# Patient Record
Sex: FEMALE | Race: WHITE | NOT HISPANIC OR LATINO | URBAN - METROPOLITAN AREA
[De-identification: names, ages, dates, MRNs, and addresses within clinical notes are randomized per-mention and may not be internally consistent; named-entity substitution may affect disease eponyms.]

---

## 2017-04-17 PROBLEM — Z00.00 ENCOUNTER FOR PREVENTIVE HEALTH EXAMINATION: Status: ACTIVE | Noted: 2017-04-17

## 2017-04-19 ENCOUNTER — OUTPATIENT (OUTPATIENT)
Dept: OUTPATIENT SERVICES | Facility: HOSPITAL | Age: 52
LOS: 1 days | End: 2017-04-19
Payer: MEDICAID

## 2017-04-19 ENCOUNTER — APPOINTMENT (OUTPATIENT)
Dept: MRI IMAGING | Facility: CLINIC | Age: 52
End: 2017-04-19

## 2017-04-19 DIAGNOSIS — Z98.89 OTHER SPECIFIED POSTPROCEDURAL STATES: Chronic | ICD-10-CM

## 2017-04-19 DIAGNOSIS — Z00.8 ENCOUNTER FOR OTHER GENERAL EXAMINATION: ICD-10-CM

## 2017-04-19 PROCEDURE — 73721 MRI JNT OF LWR EXTRE W/O DYE: CPT

## 2017-05-10 ENCOUNTER — APPOINTMENT (OUTPATIENT)
Dept: ORTHOPEDIC SURGERY | Facility: CLINIC | Age: 52
End: 2017-05-10

## 2017-05-12 ENCOUNTER — APPOINTMENT (OUTPATIENT)
Dept: ORTHOPEDIC SURGERY | Facility: CLINIC | Age: 52
End: 2017-05-12

## 2017-05-12 VITALS
HEIGHT: 66 IN | WEIGHT: 145 LBS | DIASTOLIC BLOOD PRESSURE: 75 MMHG | HEART RATE: 71 BPM | BODY MASS INDEX: 23.3 KG/M2 | SYSTOLIC BLOOD PRESSURE: 106 MMHG

## 2017-05-15 ENCOUNTER — APPOINTMENT (OUTPATIENT)
Dept: ORTHOPEDIC SURGERY | Facility: CLINIC | Age: 52
End: 2017-05-15

## 2017-05-19 RX ORDER — CELECOXIB 100 MG/1
100 CAPSULE ORAL
Qty: 30 | Refills: 0 | Status: ACTIVE | COMMUNITY
Start: 2017-05-19 | End: 1900-01-01

## 2017-05-24 RX ORDER — TRAMADOL HYDROCHLORIDE 50 MG/1
50 TABLET, COATED ORAL
Qty: 30 | Refills: 1 | Status: ACTIVE | COMMUNITY
Start: 2017-05-24 | End: 1900-01-01

## 2017-05-26 ENCOUNTER — APPOINTMENT (OUTPATIENT)
Dept: ORTHOPEDIC SURGERY | Facility: CLINIC | Age: 52
End: 2017-05-26

## 2017-06-09 ENCOUNTER — APPOINTMENT (OUTPATIENT)
Dept: ORTHOPEDIC SURGERY | Facility: CLINIC | Age: 52
End: 2017-06-09

## 2017-06-09 VITALS — BODY MASS INDEX: 23.3 KG/M2 | HEIGHT: 66 IN | WEIGHT: 145 LBS

## 2017-06-09 DIAGNOSIS — Z80.9 FAMILY HISTORY OF MALIGNANT NEOPLASM, UNSPECIFIED: ICD-10-CM

## 2017-06-09 DIAGNOSIS — Z78.9 OTHER SPECIFIED HEALTH STATUS: ICD-10-CM

## 2017-06-11 PROBLEM — Z80.9 FAMILY HISTORY OF MALIGNANT NEOPLASM: Status: ACTIVE | Noted: 2017-05-12

## 2017-06-11 RX ORDER — CEFDINIR 300 MG/1
300 CAPSULE ORAL
Qty: 14 | Refills: 0 | Status: ACTIVE | COMMUNITY
Start: 2017-03-31

## 2017-06-11 RX ORDER — ALPRAZOLAM 0.5 MG/1
0.5 TABLET ORAL
Qty: 90 | Refills: 0 | Status: ACTIVE | COMMUNITY
Start: 2016-12-28

## 2017-06-11 RX ORDER — QUETIAPINE FUMARATE 200 MG/1
200 TABLET ORAL
Qty: 30 | Refills: 0 | Status: ACTIVE | COMMUNITY
Start: 2016-12-28

## 2017-06-11 RX ORDER — PHENAZOPYRIDINE HYDROCHLORIDE 200 MG/1
200 TABLET ORAL
Qty: 6 | Refills: 0 | Status: ACTIVE | COMMUNITY
Start: 2017-03-31

## 2017-06-11 RX ORDER — ALPRAZOLAM 2 MG/1
TABLET ORAL
Refills: 0 | Status: ACTIVE | COMMUNITY

## 2017-06-11 RX ORDER — MELOXICAM 7.5 MG/1
7.5 TABLET ORAL
Qty: 30 | Refills: 0 | Status: ACTIVE | COMMUNITY
Start: 2017-04-20

## 2017-06-11 RX ORDER — GABAPENTIN 800 MG/1
800 TABLET, COATED ORAL
Qty: 90 | Refills: 0 | Status: ACTIVE | COMMUNITY
Start: 2016-12-28

## 2017-06-11 RX ORDER — BROMPHENIRAMINE MALEATE, PSEUDOEPHEDRINE HYDROCHLORIDE, 2; 30; 10 MG/5ML; MG/5ML; MG/5ML
30-2-10 SYRUP ORAL
Qty: 200 | Refills: 0 | Status: ACTIVE | COMMUNITY
Start: 2017-05-10

## 2017-06-11 RX ORDER — BENZONATATE 100 MG/1
100 CAPSULE ORAL
Qty: 30 | Refills: 0 | Status: ACTIVE | COMMUNITY
Start: 2017-05-09

## 2017-06-11 RX ORDER — VENLAFAXINE HCL 50 MG
TABLET ORAL
Refills: 0 | Status: ACTIVE | COMMUNITY

## 2017-06-11 RX ORDER — VENLAFAXINE HYDROCHLORIDE 75 MG/1
75 CAPSULE, EXTENDED RELEASE ORAL
Qty: 30 | Refills: 0 | Status: ACTIVE | COMMUNITY
Start: 2017-05-03

## 2017-06-11 RX ORDER — VENLAFAXINE HYDROCHLORIDE 37.5 MG/1
37.5 CAPSULE, EXTENDED RELEASE ORAL
Qty: 30 | Refills: 0 | Status: ACTIVE | COMMUNITY
Start: 2017-04-09

## 2017-06-11 RX ORDER — POLYETHYLENE GLYCOL 3350, SODIUM CHLORIDE, POTASSIUM CHLORIDE, SODIUM BICARBONATE, AND SODIUM SULFATE 240; 5.84; 2.98; 6.72; 22.72 G/4L; G/4L; G/4L; G/4L; G/4L
240 POWDER, FOR SOLUTION ORAL
Qty: 4000 | Refills: 0 | Status: ACTIVE | COMMUNITY
Start: 2017-04-03

## 2017-06-11 RX ORDER — LURASIDONE HYDROCHLORIDE 60 MG/1
60 TABLET, FILM COATED ORAL
Qty: 30 | Refills: 0 | Status: ACTIVE | COMMUNITY
Start: 2016-12-07

## 2017-06-11 RX ORDER — QUETIAPINE 400 MG/1
TABLET, FILM COATED ORAL
Refills: 0 | Status: ACTIVE | COMMUNITY

## 2017-06-11 RX ORDER — LURASIDONE HYDROCHLORIDE 120 MG/1
120 TABLET, FILM COATED ORAL
Qty: 30 | Refills: 0 | Status: ACTIVE | COMMUNITY
Start: 2016-12-28

## 2017-06-11 RX ORDER — SULFAMETHOXAZOLE AND TRIMETHOPRIM 800; 160 MG/1; MG/1
800-160 TABLET ORAL
Qty: 10 | Refills: 0 | Status: ACTIVE | COMMUNITY
Start: 2016-12-19

## 2017-06-11 RX ORDER — LURASIDONE HYDROCHLORIDE 20 MG/1
20 TABLET, FILM COATED ORAL
Qty: 30 | Refills: 0 | Status: ACTIVE | COMMUNITY
Start: 2017-03-12

## 2017-06-11 RX ORDER — DIPHENHYDRAMINE HCL 50 MG/1
50 CAPSULE ORAL
Qty: 60 | Refills: 0 | Status: ACTIVE | COMMUNITY
Start: 2016-12-28

## 2017-06-16 ENCOUNTER — APPOINTMENT (OUTPATIENT)
Dept: ORTHOPEDIC SURGERY | Facility: CLINIC | Age: 52
End: 2017-06-16

## 2017-06-16 DIAGNOSIS — M25.859 OTHER SPECIFIED JOINT DISORDERS, UNSPECIFIED HIP: ICD-10-CM

## 2017-06-16 RX ORDER — TRAMADOL HYDROCHLORIDE 50 MG/1
50 TABLET, COATED ORAL
Qty: 30 | Refills: 1 | Status: ACTIVE | COMMUNITY
Start: 2017-06-16 | End: 1900-01-01

## 2017-06-16 RX ORDER — LIDOCAINE 5% 700 MG/1
5 PATCH TOPICAL
Qty: 4 | Refills: 0 | Status: ACTIVE | COMMUNITY
Start: 2017-06-16 | End: 1900-01-01

## 2017-06-29 RX ORDER — TRAMADOL HYDROCHLORIDE 50 MG/1
50 TABLET, COATED ORAL 3 TIMES DAILY
Qty: 30 | Refills: 0 | Status: ACTIVE | COMMUNITY
Start: 2017-06-29 | End: 1900-01-01

## 2017-07-11 ENCOUNTER — EMERGENCY (EMERGENCY)
Facility: HOSPITAL | Age: 52
LOS: 0 days | Discharge: HOME | End: 2017-07-12
Attending: EMERGENCY MEDICINE
Payer: COMMERCIAL

## 2017-07-11 VITALS
WEIGHT: 139.99 LBS | HEIGHT: 66 IN | TEMPERATURE: 97 F | OXYGEN SATURATION: 95 % | RESPIRATION RATE: 20 BRPM | HEART RATE: 80 BPM | DIASTOLIC BLOOD PRESSURE: 84 MMHG | SYSTOLIC BLOOD PRESSURE: 114 MMHG

## 2017-07-11 DIAGNOSIS — Z98.89 OTHER SPECIFIED POSTPROCEDURAL STATES: Chronic | ICD-10-CM

## 2017-07-11 PROCEDURE — 99284 EMERGENCY DEPT VISIT MOD MDM: CPT

## 2017-07-11 NOTE — ED ADULT TRIAGE NOTE - CHIEF COMPLAINT QUOTE
"shooting,throbbing pain started on right groin to the buttocks ,right hip.lower back right knee 3 days ago getting worse"

## 2017-07-12 VITALS
SYSTOLIC BLOOD PRESSURE: 124 MMHG | TEMPERATURE: 98 F | RESPIRATION RATE: 18 BRPM | HEART RATE: 68 BPM | OXYGEN SATURATION: 96 % | DIASTOLIC BLOOD PRESSURE: 69 MMHG

## 2017-07-12 DIAGNOSIS — M25.551 PAIN IN RIGHT HIP: ICD-10-CM

## 2017-07-12 DIAGNOSIS — R10.30 LOWER ABDOMINAL PAIN, UNSPECIFIED: ICD-10-CM

## 2017-07-12 DIAGNOSIS — F32.9 MAJOR DEPRESSIVE DISORDER, SINGLE EPISODE, UNSPECIFIED: ICD-10-CM

## 2017-07-12 DIAGNOSIS — M54.10 RADICULOPATHY, SITE UNSPECIFIED: ICD-10-CM

## 2017-07-12 RX ORDER — MORPHINE SULFATE 50 MG/1
4 CAPSULE, EXTENDED RELEASE ORAL ONCE
Qty: 0 | Refills: 0 | Status: DISCONTINUED | OUTPATIENT
Start: 2017-07-12 | End: 2017-07-12

## 2017-07-12 RX ADMIN — MORPHINE SULFATE 4 MILLIGRAM(S): 50 CAPSULE, EXTENDED RELEASE ORAL at 03:13

## 2017-07-12 NOTE — ED ADULT NURSE NOTE - OBJECTIVE STATEMENT
Pt c/o R-groin pain radiating down thigh, buttocks to back of leg x 3 days.  pt has hx of labrium tear and is scheduled for sx on 8/14/17.  pt also has hx L-4 L-5 space narrowing.

## 2017-07-12 NOTE — ED PROVIDER NOTE - MEDICAL DECISION MAKING DETAILS
hip and back pain. patient received morphin with resolution. patient now in good condition and discharged with care instructions. she will see ortho tomorrow.

## 2017-07-12 NOTE — ED PROVIDER NOTE - OBJECTIVE STATEMENT
Pertinent PMH/PSH/FHx/SHx and Review of Systems contained within:  53 yo f with PMH of right hip strain and radiculoapathy presents in ED c/o pain after running out of tramadol. Patient is scheduled for surgery in a week. No aggravating or relieving factors, No fever/chills, No photophobia/eye pain/changes in vision, No ear pain/sore throat/dysphagia, No chest pain/palpitations, no SOB/cough/wheeze/stridor, No abdominal pain, No N/V/D, no dysuria/frequency/discharge, No neck/back pain, no rash, no changes in neurological status/function.

## 2017-07-31 ENCOUNTER — OUTPATIENT (OUTPATIENT)
Dept: OUTPATIENT SERVICES | Facility: HOSPITAL | Age: 52
LOS: 1 days | End: 2017-07-31
Payer: MEDICAID

## 2017-07-31 VITALS
SYSTOLIC BLOOD PRESSURE: 118 MMHG | TEMPERATURE: 99 F | WEIGHT: 141.98 LBS | HEART RATE: 72 BPM | RESPIRATION RATE: 20 BRPM | DIASTOLIC BLOOD PRESSURE: 77 MMHG | HEIGHT: 66 IN | OXYGEN SATURATION: 100 %

## 2017-07-31 DIAGNOSIS — M25.859 OTHER SPECIFIED JOINT DISORDERS, UNSPECIFIED HIP: ICD-10-CM

## 2017-07-31 DIAGNOSIS — Z98.89 OTHER SPECIFIED POSTPROCEDURAL STATES: Chronic | ICD-10-CM

## 2017-07-31 DIAGNOSIS — Z90.49 ACQUIRED ABSENCE OF OTHER SPECIFIED PARTS OF DIGESTIVE TRACT: Chronic | ICD-10-CM

## 2017-07-31 DIAGNOSIS — Z01.818 ENCOUNTER FOR OTHER PREPROCEDURAL EXAMINATION: ICD-10-CM

## 2017-07-31 DIAGNOSIS — F31.30 BIPOLAR DISORDER, CURRENT EPISODE DEPRESSED, MILD OR MODERATE SEVERITY, UNSPECIFIED: ICD-10-CM

## 2017-07-31 DIAGNOSIS — Z98.890 OTHER SPECIFIED POSTPROCEDURAL STATES: Chronic | ICD-10-CM

## 2017-07-31 LAB
ANION GAP SERPL CALC-SCNC: 15 MMOL/L — SIGNIFICANT CHANGE UP (ref 5–17)
BUN SERPL-MCNC: 10 MG/DL — SIGNIFICANT CHANGE UP (ref 7–23)
CALCIUM SERPL-MCNC: 9.3 MG/DL — SIGNIFICANT CHANGE UP (ref 8.4–10.5)
CHLORIDE SERPL-SCNC: 101 MMOL/L — SIGNIFICANT CHANGE UP (ref 96–108)
CO2 SERPL-SCNC: 22 MMOL/L — SIGNIFICANT CHANGE UP (ref 22–31)
CREAT SERPL-MCNC: 0.92 MG/DL — SIGNIFICANT CHANGE UP (ref 0.5–1.3)
GLUCOSE SERPL-MCNC: 78 MG/DL — SIGNIFICANT CHANGE UP (ref 70–99)
HCT VFR BLD CALC: 34.7 % — SIGNIFICANT CHANGE UP (ref 34.5–45)
HGB BLD-MCNC: 11.4 G/DL — LOW (ref 11.5–15.5)
MCHC RBC-ENTMCNC: 30.4 PG — SIGNIFICANT CHANGE UP (ref 27–34)
MCHC RBC-ENTMCNC: 32.9 GM/DL — SIGNIFICANT CHANGE UP (ref 32–36)
MCV RBC AUTO: 92.5 FL — SIGNIFICANT CHANGE UP (ref 80–100)
PLATELET # BLD AUTO: 268 K/UL — SIGNIFICANT CHANGE UP (ref 150–400)
POTASSIUM SERPL-MCNC: 4.3 MMOL/L — SIGNIFICANT CHANGE UP (ref 3.5–5.3)
POTASSIUM SERPL-SCNC: 4.3 MMOL/L — SIGNIFICANT CHANGE UP (ref 3.5–5.3)
RBC # BLD: 3.75 M/UL — LOW (ref 3.8–5.2)
RBC # FLD: 14 % — SIGNIFICANT CHANGE UP (ref 10.3–14.5)
SODIUM SERPL-SCNC: 138 MMOL/L — SIGNIFICANT CHANGE UP (ref 135–145)
WBC # BLD: 4.44 K/UL — SIGNIFICANT CHANGE UP (ref 3.8–10.5)
WBC # FLD AUTO: 4.44 K/UL — SIGNIFICANT CHANGE UP (ref 3.8–10.5)

## 2017-07-31 PROCEDURE — G0463: CPT

## 2017-07-31 PROCEDURE — 85027 COMPLETE CBC AUTOMATED: CPT

## 2017-07-31 PROCEDURE — 80048 BASIC METABOLIC PNL TOTAL CA: CPT

## 2017-07-31 RX ORDER — VENLAFAXINE HCL 75 MG
1 CAPSULE, EXT RELEASE 24 HR ORAL
Qty: 0 | Refills: 0 | COMMUNITY

## 2017-07-31 RX ORDER — CEFAZOLIN SODIUM 1 G
2000 VIAL (EA) INJECTION ONCE
Qty: 0 | Refills: 0 | Status: DISCONTINUED | OUTPATIENT
Start: 2017-08-14 | End: 2017-08-29

## 2017-07-31 NOTE — H&P PST ADULT - PROBLEM SELECTOR PLAN 1
scheduled for right hip arthroscopy resection of impingement and labral repair.  urine pregnancy day os surgery

## 2017-07-31 NOTE — H&P PST ADULT - NSANTHOSAYNRD_GEN_A_CORE
No. ROSA screening performed.  STOP BANG Legend: 0-2 = LOW Risk; 3-4 = INTERMEDIATE Risk; 5-8 = HIGH Risk

## 2017-07-31 NOTE — H&P PST ADULT - PSH
H/O colonoscopy    H/O dilation and curettage    S/P appendectomy H/O colonoscopy    H/O dilation and curettage    History of appendectomy    S/P appendectomy

## 2017-07-31 NOTE — H&P PST ADULT - HISTORY OF PRESENT ILLNESS
52 year old female with h/o bipolar disorders, presents to presurgical testing for scheduling right hip arthroscopy resection of impingement and labral repair for right hip joint disorders.

## 2017-08-03 RX ORDER — TRAMADOL HYDROCHLORIDE 50 MG/1
50 TABLET, COATED ORAL
Qty: 30 | Refills: 1 | Status: ACTIVE | COMMUNITY
Start: 2017-08-03 | End: 1900-01-01

## 2017-08-14 ENCOUNTER — APPOINTMENT (OUTPATIENT)
Dept: ORTHOPEDIC SURGERY | Facility: HOSPITAL | Age: 52
End: 2017-08-14

## 2017-08-14 ENCOUNTER — OUTPATIENT (OUTPATIENT)
Dept: OUTPATIENT SERVICES | Facility: HOSPITAL | Age: 52
LOS: 1 days | End: 2017-08-14
Payer: MEDICAID

## 2017-08-14 VITALS
RESPIRATION RATE: 16 BRPM | TEMPERATURE: 98 F | HEART RATE: 69 BPM | OXYGEN SATURATION: 100 % | SYSTOLIC BLOOD PRESSURE: 101 MMHG | DIASTOLIC BLOOD PRESSURE: 70 MMHG

## 2017-08-14 VITALS
TEMPERATURE: 99 F | OXYGEN SATURATION: 99 % | HEART RATE: 82 BPM | RESPIRATION RATE: 14 BRPM | DIASTOLIC BLOOD PRESSURE: 58 MMHG | SYSTOLIC BLOOD PRESSURE: 126 MMHG

## 2017-08-14 DIAGNOSIS — Z98.89 OTHER SPECIFIED POSTPROCEDURAL STATES: Chronic | ICD-10-CM

## 2017-08-14 DIAGNOSIS — M25.859 OTHER SPECIFIED JOINT DISORDERS, UNSPECIFIED HIP: ICD-10-CM

## 2017-08-14 DIAGNOSIS — Z98.890 OTHER SPECIFIED POSTPROCEDURAL STATES: Chronic | ICD-10-CM

## 2017-08-14 DIAGNOSIS — Z01.818 ENCOUNTER FOR OTHER PREPROCEDURAL EXAMINATION: ICD-10-CM

## 2017-08-14 DIAGNOSIS — Z90.49 ACQUIRED ABSENCE OF OTHER SPECIFIED PARTS OF DIGESTIVE TRACT: Chronic | ICD-10-CM

## 2017-08-14 LAB — HCG UR QL: NEGATIVE — SIGNIFICANT CHANGE UP

## 2017-08-14 PROCEDURE — 29916 HIP ARTHRO W/LABRAL REPAIR: CPT | Mod: RT

## 2017-08-14 PROCEDURE — 29914 HIP ARTHRO W/FEMOROPLASTY: CPT | Mod: RT

## 2017-08-14 PROCEDURE — C1713: CPT

## 2017-08-14 PROCEDURE — 97161 PT EVAL LOW COMPLEX 20 MIN: CPT

## 2017-08-14 PROCEDURE — 29915 HIP ARTHRO ACETABULOPLASTY: CPT | Mod: RT

## 2017-08-14 PROCEDURE — C1886: CPT

## 2017-08-14 PROCEDURE — 81025 URINE PREGNANCY TEST: CPT

## 2017-08-14 PROCEDURE — 76000 FLUOROSCOPY <1 HR PHYS/QHP: CPT

## 2017-08-14 RX ORDER — LIDOCAINE HCL 20 MG/ML
0.2 VIAL (ML) INJECTION ONCE
Qty: 0 | Refills: 0 | Status: DISCONTINUED | OUTPATIENT
Start: 2017-08-14 | End: 2017-08-14

## 2017-08-14 RX ORDER — SODIUM CHLORIDE 9 MG/ML
3 INJECTION INTRAMUSCULAR; INTRAVENOUS; SUBCUTANEOUS EVERY 8 HOURS
Qty: 0 | Refills: 0 | Status: DISCONTINUED | OUTPATIENT
Start: 2017-08-14 | End: 2017-08-14

## 2017-08-14 RX ORDER — IBUPROFEN 200 MG
1 TABLET ORAL
Qty: 28 | Refills: 0 | OUTPATIENT
Start: 2017-08-14 | End: 2017-08-21

## 2017-08-14 RX ORDER — ASPIRIN/CALCIUM CARB/MAGNESIUM 324 MG
1 TABLET ORAL
Qty: 60 | Refills: 0 | OUTPATIENT
Start: 2017-08-14 | End: 2017-09-13

## 2017-08-14 RX ORDER — OXYCODONE HYDROCHLORIDE 5 MG/1
5 TABLET ORAL ONCE
Qty: 0 | Refills: 0 | Status: DISCONTINUED | OUTPATIENT
Start: 2017-08-14 | End: 2017-08-14

## 2017-08-14 RX ORDER — POLYETHYLENE GLYCOL 3350 17 G/17G
17 POWDER, FOR SOLUTION ORAL
Qty: 1 | Refills: 0 | OUTPATIENT
Start: 2017-08-14

## 2017-08-14 RX ORDER — HYDROMORPHONE HYDROCHLORIDE 2 MG/ML
0.5 INJECTION INTRAMUSCULAR; INTRAVENOUS; SUBCUTANEOUS
Qty: 0 | Refills: 0 | Status: DISCONTINUED | OUTPATIENT
Start: 2017-08-14 | End: 2017-08-14

## 2017-08-14 RX ORDER — ONDANSETRON 8 MG/1
4 TABLET, FILM COATED ORAL ONCE
Qty: 0 | Refills: 0 | Status: DISCONTINUED | OUTPATIENT
Start: 2017-08-14 | End: 2017-08-14

## 2017-08-14 RX ADMIN — OXYCODONE HYDROCHLORIDE 5 MILLIGRAM(S): 5 TABLET ORAL at 12:19

## 2017-08-14 RX ADMIN — HYDROMORPHONE HYDROCHLORIDE 0.5 MILLIGRAM(S): 2 INJECTION INTRAMUSCULAR; INTRAVENOUS; SUBCUTANEOUS at 11:10

## 2017-08-14 RX ADMIN — HYDROMORPHONE HYDROCHLORIDE 0.5 MILLIGRAM(S): 2 INJECTION INTRAMUSCULAR; INTRAVENOUS; SUBCUTANEOUS at 12:15

## 2017-08-14 RX ADMIN — OXYCODONE HYDROCHLORIDE 5 MILLIGRAM(S): 5 TABLET ORAL at 11:47

## 2017-08-14 RX ADMIN — HYDROMORPHONE HYDROCHLORIDE 0.5 MILLIGRAM(S): 2 INJECTION INTRAMUSCULAR; INTRAVENOUS; SUBCUTANEOUS at 12:18

## 2017-08-14 RX ADMIN — HYDROMORPHONE HYDROCHLORIDE 0.5 MILLIGRAM(S): 2 INJECTION INTRAMUSCULAR; INTRAVENOUS; SUBCUTANEOUS at 10:50

## 2017-08-14 NOTE — PHYSICAL THERAPY INITIAL EVALUATION ADULT - REHAB POTENTIAL, PT EVAL
Patient is cleared for D/C home from physical therapy standpoint. Patient is agreeable, RN and MD aware.

## 2017-08-14 NOTE — ASU DISCHARGE PLAN (ADULT/PEDIATRIC). - MEDICATION SUMMARY - MEDICATIONS TO TAKE
I will START or STAY ON the medications listed below when I get home from the hospital:    ibuprofen 600 mg oral tablet  -- 1 tab(s) by mouth every 6 hours, As Needed for mild pain. Take with food/snack for stomach protection  -- Do not take this drug if you are pregnant.  It is very important that you take or use this exactly as directed.  Do not skip doses or discontinue unless directed by your doctor.  May cause drowsiness or dizziness.  Obtain medical advice before taking any non-prescription drugs as some may affect the action of this medication.  Take with food or milk.    -- Indication: For pain    Percocet 5/325 oral tablet  -- 1 tab(s) by mouth every 8 hours, As Needed for severe pain. MDD:3 tabs  -- Caution federal law prohibits the transfer of this drug to any person other  than the person for whom it was prescribed.  May cause drowsiness.  Alcohol may intensify this effect.  Use care when operating dangerous machinery.  This prescription cannot be refilled.  This product contains acetaminophen.  Do not use  with any other product containing acetaminophen to prevent possible liver damage.  Using more of this medication than prescribed may cause serious breathing problems.    -- Indication: For pain    traMADol 200 mg/24 hours oral capsule, extended release  -- 1 cap(s) by mouth four times a day   -- Indication: For pain    Effexor  --  by mouth   225 mg tablet daily in the morning   -- Indication: For depression    SEROquel  --  by mouth   600 mg tablet in the evening  -- Indication: For bipolar    Xanax 2 mg oral tablet  --  by mouth   twice a day as needed   -- Indication: For anxiety    MiraLax oral powder for reconstitution  -- 17 gram(s) by mouth once a day, As Needed -for constipation  -- Dilute this medication with liquid before administration.  It is very important that you take or use this exactly as directed.  Do not skip doses or discontinue unless directed by your doctor.    -- Indication: For constipation    multivitamin  --   1 tablet daily   -- Indication: For health I will START or STAY ON the medications listed below when I get home from the hospital:    ibuprofen 600 mg oral tablet  -- 1 tab(s) by mouth every 6 hours, As Needed for mild pain. Take with food/snack for stomach protection  -- Do not take this drug if you are pregnant.  It is very important that you take or use this exactly as directed.  Do not skip doses or discontinue unless directed by your doctor.  May cause drowsiness or dizziness.  Obtain medical advice before taking any non-prescription drugs as some may affect the action of this medication.  Take with food or milk.    -- Indication: For pain    Percocet 5/325 oral tablet  -- 1 tab(s) by mouth every 8 hours, As Needed for severe pain. MDD:3 tabs  -- Caution federal law prohibits the transfer of this drug to any person other  than the person for whom it was prescribed.  May cause drowsiness.  Alcohol may intensify this effect.  Use care when operating dangerous machinery.  This prescription cannot be refilled.  This product contains acetaminophen.  Do not use  with any other product containing acetaminophen to prevent possible liver damage.  Using more of this medication than prescribed may cause serious breathing problems.    -- Indication: For pain    Ecotrin 325 mg oral delayed release tablet  -- 1 tab(s) by mouth 2 times a day x 6 weeks  -- Swallow whole.  Do not crush.  Take with food or milk.    -- Indication: For dvt ppx    traMADol 200 mg/24 hours oral capsule, extended release  -- 1 cap(s) by mouth four times a day   -- Indication: For pain    Effexor  --  by mouth   225 mg tablet daily in the morning   -- Indication: For depression    SEROquel  --  by mouth   600 mg tablet in the evening  -- Indication: For bipolar    Xanax 2 mg oral tablet  --  by mouth   twice a day as needed   -- Indication: For anxiety    MiraLax oral powder for reconstitution  -- 17 gram(s) by mouth once a day, As Needed -for constipation  -- Dilute this medication with liquid before administration.  It is very important that you take or use this exactly as directed.  Do not skip doses or discontinue unless directed by your doctor.    -- Indication: For constipation    multivitamin  --   1 tablet daily   -- Indication: For supplement

## 2017-08-14 NOTE — ASU DISCHARGE PLAN (ADULT/PEDIATRIC). - NOTIFY
Fever greater than 101/Swelling that continues/Numbness, color, or temperature change to extremity/Numbness, tingling/Bleeding that does not stop/Pain not relieved by Medications

## 2017-08-15 RX ORDER — OXYCODONE AND ACETAMINOPHEN 5; 325 MG/1; MG/1
5-325 TABLET ORAL
Qty: 30 | Refills: 0 | Status: ACTIVE | COMMUNITY
Start: 2017-08-15 | End: 1900-01-01

## 2017-08-16 ENCOUNTER — TRANSCRIPTION ENCOUNTER (OUTPATIENT)
Age: 52
End: 2017-08-16

## 2017-08-23 ENCOUNTER — APPOINTMENT (OUTPATIENT)
Dept: ORTHOPEDIC SURGERY | Facility: CLINIC | Age: 52
End: 2017-08-23
Payer: MEDICAID

## 2017-08-23 VITALS — BODY MASS INDEX: 23.3 KG/M2 | WEIGHT: 145 LBS | HEIGHT: 66 IN

## 2017-08-23 PROCEDURE — 73502 X-RAY EXAM HIP UNI 2-3 VIEWS: CPT | Mod: RT

## 2017-08-23 PROCEDURE — 99024 POSTOP FOLLOW-UP VISIT: CPT

## 2017-08-23 RX ORDER — OXYCODONE AND ACETAMINOPHEN 10; 325 MG/1; MG/1
10-325 TABLET ORAL 3 TIMES DAILY
Qty: 60 | Refills: 0 | Status: ACTIVE | COMMUNITY
Start: 2017-08-23 | End: 1900-01-01

## 2017-09-13 RX ORDER — TRAMADOL HYDROCHLORIDE 50 MG/1
50 TABLET, COATED ORAL
Qty: 30 | Refills: 1 | Status: ACTIVE | COMMUNITY
Start: 2017-09-13 | End: 1900-01-01

## 2017-09-21 ENCOUNTER — APPOINTMENT (OUTPATIENT)
Dept: ORTHOPEDIC SURGERY | Facility: CLINIC | Age: 52
End: 2017-09-21
Payer: MEDICAID

## 2017-09-21 VITALS — WEIGHT: 145 LBS | HEIGHT: 66 IN | BODY MASS INDEX: 23.3 KG/M2

## 2017-09-21 VITALS — WEIGHT: 145 LBS | BODY MASS INDEX: 23.3 KG/M2 | HEIGHT: 66 IN

## 2017-09-21 PROCEDURE — 99024 POSTOP FOLLOW-UP VISIT: CPT

## 2017-09-21 RX ORDER — OXYCODONE AND ACETAMINOPHEN 5; 325 MG/1; MG/1
5-325 TABLET ORAL
Qty: 40 | Refills: 0 | Status: ACTIVE | COMMUNITY
Start: 2017-09-21 | End: 1900-01-01

## 2017-10-05 RX ORDER — TRAMADOL HYDROCHLORIDE 50 MG/1
50 TABLET, COATED ORAL
Qty: 40 | Refills: 0 | Status: ACTIVE | COMMUNITY
Start: 2017-10-05 | End: 1900-01-01

## 2017-10-19 RX ORDER — TRAMADOL HYDROCHLORIDE 50 MG/1
50 TABLET, COATED ORAL
Qty: 30 | Refills: 1 | Status: ACTIVE | COMMUNITY
Start: 2017-10-19 | End: 1900-01-01

## 2017-10-27 ENCOUNTER — APPOINTMENT (OUTPATIENT)
Dept: ORTHOPEDIC SURGERY | Facility: CLINIC | Age: 52
End: 2017-10-27
Payer: MEDICAID

## 2017-10-27 VITALS
WEIGHT: 145 LBS | HEART RATE: 79 BPM | DIASTOLIC BLOOD PRESSURE: 93 MMHG | BODY MASS INDEX: 28.47 KG/M2 | SYSTOLIC BLOOD PRESSURE: 130 MMHG | HEIGHT: 60 IN

## 2017-10-27 DIAGNOSIS — M47.817 SPONDYLOSIS W/OUT MYELOPATHY OR RADICULOPATHY, LUMBOSACRAL REGION: ICD-10-CM

## 2017-10-27 PROCEDURE — 72100 X-RAY EXAM L-S SPINE 2/3 VWS: CPT

## 2017-10-27 PROCEDURE — 99214 OFFICE O/P EST MOD 30 MIN: CPT | Mod: 24

## 2017-10-27 PROCEDURE — 72170 X-RAY EXAM OF PELVIS: CPT

## 2017-10-27 RX ORDER — IBUPROFEN 600 MG/1
600 TABLET, FILM COATED ORAL
Qty: 28 | Refills: 0 | Status: ACTIVE | COMMUNITY
Start: 2017-08-14

## 2017-10-27 RX ORDER — ALPRAZOLAM 1 MG/1
1 TABLET ORAL
Qty: 60 | Refills: 0 | Status: ACTIVE | COMMUNITY
Start: 2017-07-07

## 2017-10-27 RX ORDER — VENLAFAXINE HYDROCHLORIDE 150 MG/1
150 CAPSULE, EXTENDED RELEASE ORAL
Qty: 30 | Refills: 0 | Status: ACTIVE | COMMUNITY
Start: 2017-07-19

## 2017-10-27 RX ORDER — PSYLLIUM HUSK 3.4 G/7G
30.9 POWDER, FOR SOLUTION ORAL
Qty: 538 | Refills: 0 | Status: ACTIVE | COMMUNITY
Start: 2017-10-03

## 2017-10-27 RX ORDER — ASPIRIN 325 MG/1
325 TABLET ORAL
Qty: 60 | Refills: 0 | Status: ACTIVE | COMMUNITY
Start: 2017-08-14

## 2017-10-27 RX ORDER — POLYETHYLENE GLYCOL 3350 17 G/17G
17 POWDER, FOR SOLUTION ORAL
Qty: 255 | Refills: 0 | Status: ACTIVE | COMMUNITY
Start: 2017-08-14

## 2017-10-27 RX ORDER — TRAZODONE HYDROCHLORIDE 50 MG/1
50 TABLET ORAL
Qty: 30 | Refills: 0 | Status: ACTIVE | COMMUNITY
Start: 2017-10-25

## 2017-11-03 ENCOUNTER — RX RENEWAL (OUTPATIENT)
Age: 52
End: 2017-11-03

## 2017-11-03 ENCOUNTER — OTHER (OUTPATIENT)
Age: 52
End: 2017-11-03

## 2017-11-03 RX ORDER — TRAMADOL HYDROCHLORIDE 50 MG/1
50 TABLET, COATED ORAL 3 TIMES DAILY
Qty: 84 | Refills: 0 | Status: ACTIVE | COMMUNITY
Start: 2017-11-03 | End: 1900-01-01

## 2017-11-03 RX ORDER — MUPIROCIN 20 MG/G
2 OINTMENT TOPICAL
Qty: 22 | Refills: 0 | Status: ACTIVE | COMMUNITY
Start: 2017-11-03 | End: 1900-01-01

## 2017-11-07 ENCOUNTER — OUTPATIENT (OUTPATIENT)
Dept: OUTPATIENT SERVICES | Facility: HOSPITAL | Age: 52
LOS: 1 days | End: 2017-11-07
Payer: MEDICAID

## 2017-11-07 VITALS
RESPIRATION RATE: 14 BRPM | TEMPERATURE: 98 F | DIASTOLIC BLOOD PRESSURE: 80 MMHG | HEART RATE: 80 BPM | HEIGHT: 65 IN | WEIGHT: 149.91 LBS | SYSTOLIC BLOOD PRESSURE: 122 MMHG

## 2017-11-07 DIAGNOSIS — M16.11 UNILATERAL PRIMARY OSTEOARTHRITIS, RIGHT HIP: ICD-10-CM

## 2017-11-07 DIAGNOSIS — Z98.890 OTHER SPECIFIED POSTPROCEDURAL STATES: Chronic | ICD-10-CM

## 2017-11-07 DIAGNOSIS — Z98.89 OTHER SPECIFIED POSTPROCEDURAL STATES: Chronic | ICD-10-CM

## 2017-11-07 DIAGNOSIS — F31.30 BIPOLAR DISORDER, CURRENT EPISODE DEPRESSED, MILD OR MODERATE SEVERITY, UNSPECIFIED: ICD-10-CM

## 2017-11-07 DIAGNOSIS — Z90.49 ACQUIRED ABSENCE OF OTHER SPECIFIED PARTS OF DIGESTIVE TRACT: Chronic | ICD-10-CM

## 2017-11-07 LAB
APPEARANCE UR: CLEAR — SIGNIFICANT CHANGE UP
BILIRUB UR-MCNC: NEGATIVE — SIGNIFICANT CHANGE UP
BLD GP AB SCN SERPL QL: NEGATIVE — SIGNIFICANT CHANGE UP
BLOOD UR QL VISUAL: NEGATIVE — SIGNIFICANT CHANGE UP
BUN SERPL-MCNC: 19 MG/DL — SIGNIFICANT CHANGE UP (ref 7–23)
CALCIUM SERPL-MCNC: 9.6 MG/DL — SIGNIFICANT CHANGE UP (ref 8.4–10.5)
CHLORIDE SERPL-SCNC: 102 MMOL/L — SIGNIFICANT CHANGE UP (ref 98–107)
CO2 SERPL-SCNC: 29 MMOL/L — SIGNIFICANT CHANGE UP (ref 22–31)
COLOR SPEC: SIGNIFICANT CHANGE UP
CREAT SERPL-MCNC: 1.05 MG/DL — SIGNIFICANT CHANGE UP (ref 0.5–1.3)
GLUCOSE SERPL-MCNC: 72 MG/DL — SIGNIFICANT CHANGE UP (ref 70–99)
GLUCOSE UR-MCNC: NEGATIVE — SIGNIFICANT CHANGE UP
HBA1C BLD-MCNC: 5.5 % — SIGNIFICANT CHANGE UP (ref 4–5.6)
HCG SERPL-ACNC: 10.24 MIU/ML — SIGNIFICANT CHANGE UP
HCT VFR BLD CALC: 39.4 % — SIGNIFICANT CHANGE UP (ref 34.5–45)
HGB BLD-MCNC: 12.6 G/DL — SIGNIFICANT CHANGE UP (ref 11.5–15.5)
KETONES UR-MCNC: NEGATIVE — SIGNIFICANT CHANGE UP
LEUKOCYTE ESTERASE UR-ACNC: NEGATIVE — SIGNIFICANT CHANGE UP
MCHC RBC-ENTMCNC: 29.9 PG — SIGNIFICANT CHANGE UP (ref 27–34)
MCHC RBC-ENTMCNC: 32 % — SIGNIFICANT CHANGE UP (ref 32–36)
MCV RBC AUTO: 93.4 FL — SIGNIFICANT CHANGE UP (ref 80–100)
MUCOUS THREADS # UR AUTO: SIGNIFICANT CHANGE UP
NITRITE UR-MCNC: NEGATIVE — SIGNIFICANT CHANGE UP
NRBC # FLD: 0 — SIGNIFICANT CHANGE UP
PH UR: 6 — SIGNIFICANT CHANGE UP (ref 4.6–8)
PLATELET # BLD AUTO: 341 K/UL — SIGNIFICANT CHANGE UP (ref 150–400)
PMV BLD: 9.5 FL — SIGNIFICANT CHANGE UP (ref 7–13)
POTASSIUM SERPL-MCNC: 4.2 MMOL/L — SIGNIFICANT CHANGE UP (ref 3.5–5.3)
POTASSIUM SERPL-SCNC: 4.2 MMOL/L — SIGNIFICANT CHANGE UP (ref 3.5–5.3)
PROT UR-MCNC: NEGATIVE — SIGNIFICANT CHANGE UP
RBC # BLD: 4.22 M/UL — SIGNIFICANT CHANGE UP (ref 3.8–5.2)
RBC # FLD: 12.9 % — SIGNIFICANT CHANGE UP (ref 10.3–14.5)
RH IG SCN BLD-IMP: POSITIVE — SIGNIFICANT CHANGE UP
SODIUM SERPL-SCNC: 143 MMOL/L — SIGNIFICANT CHANGE UP (ref 135–145)
SP GR SPEC: 1.01 — SIGNIFICANT CHANGE UP (ref 1–1.03)
UROBILINOGEN FLD QL: NORMAL E.U. — SIGNIFICANT CHANGE UP (ref 0.1–0.2)
WBC # BLD: 4.55 K/UL — SIGNIFICANT CHANGE UP (ref 3.8–10.5)
WBC # FLD AUTO: 4.55 K/UL — SIGNIFICANT CHANGE UP (ref 3.8–10.5)
WBC UR QL: SIGNIFICANT CHANGE UP (ref 0–?)

## 2017-11-07 PROCEDURE — 93010 ELECTROCARDIOGRAM REPORT: CPT

## 2017-11-07 RX ORDER — TRAMADOL HYDROCHLORIDE 50 MG/1
50 TABLET ORAL ONCE
Qty: 0 | Refills: 0 | Status: DISCONTINUED | OUTPATIENT
Start: 2017-11-13 | End: 2017-11-13

## 2017-11-07 RX ORDER — GABAPENTIN 400 MG/1
300 CAPSULE ORAL ONCE
Qty: 0 | Refills: 0 | Status: COMPLETED | OUTPATIENT
Start: 2017-11-13 | End: 2017-11-13

## 2017-11-07 RX ORDER — TRAMADOL HYDROCHLORIDE 50 MG/1
1 TABLET ORAL
Qty: 0 | Refills: 0 | COMMUNITY

## 2017-11-07 RX ORDER — ACETAMINOPHEN 500 MG
975 TABLET ORAL ONCE
Qty: 0 | Refills: 0 | Status: COMPLETED | OUTPATIENT
Start: 2017-11-13 | End: 2017-11-13

## 2017-11-07 RX ORDER — PANTOPRAZOLE SODIUM 20 MG/1
40 TABLET, DELAYED RELEASE ORAL ONCE
Qty: 0 | Refills: 0 | Status: COMPLETED | OUTPATIENT
Start: 2017-11-13 | End: 2017-11-13

## 2017-11-07 RX ORDER — VENLAFAXINE HCL 75 MG
0 CAPSULE, EXT RELEASE 24 HR ORAL
Qty: 0 | Refills: 0 | COMMUNITY

## 2017-11-07 RX ORDER — SODIUM CHLORIDE 9 MG/ML
3 INJECTION INTRAMUSCULAR; INTRAVENOUS; SUBCUTANEOUS EVERY 8 HOURS
Qty: 0 | Refills: 0 | Status: DISCONTINUED | OUTPATIENT
Start: 2017-11-13 | End: 2017-11-14

## 2017-11-07 NOTE — H&P PST ADULT - NEGATIVE GENERAL SYMPTOMS
no weight loss/no malaise/no fatigue/no weight gain/no polyphagia/no polydipsia/no chills/no sweating/no anorexia/no fever/no polyuria

## 2017-11-07 NOTE — H&P PST ADULT - HISTORY OF PRESENT ILLNESS
52 year old female presents to presurgical testing with diagnosis of unilateral primary osteoarthritis, right hip scheduled for right total hip replacement direct anterior approach for 11/13/17. Pt is s/p right hip arthroscopy, labral repair on 8/14/17, with increasing right hip pain and dysfunction. Pt reports right hip weakness, instability falls at home. Left hip is also painful but not as much. Xray and MRI of right hip completed and referred for surgical evaluation. 88

## 2017-11-07 NOTE — H&P PST ADULT - NEGATIVE CARDIOVASCULAR SYMPTOMS
no peripheral edema/no dyspnea on exertion/no claudication/no orthopnea/no paroxysmal nocturnal dyspnea/no chest pain/no palpitations

## 2017-11-07 NOTE — H&P PST ADULT - PROBLEM SELECTOR PLAN 1
Pt is scheduled for right total hip replacement direct anterior approach for 11/13/17. Preop instructions, pepcid, surgical instructions provided. Pt stated understanding. Pending medical clearance, completed appt on 11/6/17.

## 2017-11-07 NOTE — H&P PST ADULT - PMH
Anemia    Appendicitis  1996  Bipolar depression    Other specified joint disorders, unspecified hip    Unilateral primary osteoarthritis, right hip

## 2017-11-07 NOTE — H&P PST ADULT - MUSCULOSKELETAL
details… detailed exam no calf tenderness/normal strength/decreased ROM due to pain/right hip/no joint erythema/no joint swelling/no joint warmth

## 2017-11-07 NOTE — H&P PST ADULT - PROBLEM SELECTOR PLAN 2
Instructed to take xanax on the morning of procedure. Instructed to take Seroquel the night prior to procedure as usual

## 2017-11-07 NOTE — H&P PST ADULT - PSH
H/O arthroscopy  Right hip, labral repair 8/14/17  H/O colonoscopy    H/O dilation and curettage    History of appendectomy

## 2017-11-07 NOTE — H&P PST ADULT - NEGATIVE ENMT SYMPTOMS
no ear pain/no tinnitus/no hearing difficulty/no nose bleeds/no sinus symptoms/no post-nasal discharge/no throat pain/no dysphagia

## 2017-11-07 NOTE — H&P PST ADULT - FAMILY HISTORY
Mother  Still living? Unknown  Family history of depression, Age at diagnosis: Age Unknown     Sibling  Still living? Unknown  Family history of schizophrenia, Age at diagnosis: Age Unknown

## 2017-11-07 NOTE — H&P PST ADULT - NEGATIVE OPHTHALMOLOGIC SYMPTOMS
no loss of vision L/no discharge R/no pain L/no diplopia/no blurred vision L/no loss of vision R/no discharge L/no pain R/no blurred vision R

## 2017-11-07 NOTE — H&P PST ADULT - NEGATIVE GENERAL GENITOURINARY SYMPTOMS
no incontinence/normal urinary frequency/no flank pain R/no bladder infections/no urinary hesitancy/no dysuria/no flank pain L/no renal colic/no hematuria/no nocturia

## 2017-11-07 NOTE — H&P PST ADULT - NEGATIVE NEUROLOGICAL SYMPTOMS
no weakness/no paresthesias/no transient paralysis/no vertigo/no generalized seizures/no tremors/no loss of sensation/no facial palsy/no focal seizures/no syncope/no loss of consciousness/no confusion

## 2017-11-07 NOTE — H&P PST ADULT - RS GEN PE MLT RESP DETAILS PC
respirations non-labored/no rhonchi/no subcutaneous emphysema/clear to auscultation bilaterally/no chest wall tenderness/no wheezes/no intercostal retractions/no rales/airway patent/good air movement/breath sounds equal

## 2017-11-08 ENCOUNTER — OTHER (OUTPATIENT)
Age: 52
End: 2017-11-08

## 2017-11-08 DIAGNOSIS — M16.0 BILATERAL PRIMARY OSTEOARTHRITIS OF HIP: ICD-10-CM

## 2017-11-08 LAB
BACTERIA UR CULT: SIGNIFICANT CHANGE UP
SPECIMEN SOURCE: SIGNIFICANT CHANGE UP
SPECIMEN SOURCE: SIGNIFICANT CHANGE UP

## 2017-11-09 ENCOUNTER — RESULT REVIEW (OUTPATIENT)
Age: 52
End: 2017-11-09

## 2017-11-09 LAB — BACTERIA NPH CULT: SIGNIFICANT CHANGE UP

## 2017-11-12 ENCOUNTER — FORM ENCOUNTER (OUTPATIENT)
Age: 52
End: 2017-11-12

## 2017-11-13 ENCOUNTER — APPOINTMENT (OUTPATIENT)
Dept: ORTHOPEDIC SURGERY | Facility: HOSPITAL | Age: 52
End: 2017-11-13

## 2017-11-13 ENCOUNTER — RESULT REVIEW (OUTPATIENT)
Age: 52
End: 2017-11-13

## 2017-11-13 ENCOUNTER — INPATIENT (INPATIENT)
Facility: HOSPITAL | Age: 52
LOS: 0 days | Discharge: HOME HEALTH | End: 2017-11-14
Attending: ORTHOPAEDIC SURGERY | Admitting: ORTHOPAEDIC SURGERY
Payer: MEDICAID

## 2017-11-13 VITALS
TEMPERATURE: 98 F | HEIGHT: 65 IN | SYSTOLIC BLOOD PRESSURE: 119 MMHG | WEIGHT: 149.91 LBS | DIASTOLIC BLOOD PRESSURE: 68 MMHG | RESPIRATION RATE: 20 BRPM | HEART RATE: 70 BPM | OXYGEN SATURATION: 100 %

## 2017-11-13 DIAGNOSIS — Z98.890 OTHER SPECIFIED POSTPROCEDURAL STATES: Chronic | ICD-10-CM

## 2017-11-13 DIAGNOSIS — M16.11 UNILATERAL PRIMARY OSTEOARTHRITIS, RIGHT HIP: ICD-10-CM

## 2017-11-13 DIAGNOSIS — Z90.49 ACQUIRED ABSENCE OF OTHER SPECIFIED PARTS OF DIGESTIVE TRACT: Chronic | ICD-10-CM

## 2017-11-13 LAB
BUN SERPL-MCNC: 11 MG/DL — SIGNIFICANT CHANGE UP (ref 7–23)
CALCIUM SERPL-MCNC: 8.9 MG/DL — SIGNIFICANT CHANGE UP (ref 8.4–10.5)
CHLORIDE SERPL-SCNC: 104 MMOL/L — SIGNIFICANT CHANGE UP (ref 98–107)
CO2 SERPL-SCNC: 29 MMOL/L — SIGNIFICANT CHANGE UP (ref 22–31)
CREAT SERPL-MCNC: 0.84 MG/DL — SIGNIFICANT CHANGE UP (ref 0.5–1.3)
GLUCOSE SERPL-MCNC: 116 MG/DL — HIGH (ref 70–99)
HCG UR QL: NEGATIVE — SIGNIFICANT CHANGE UP
HCT VFR BLD CALC: 32.1 % — LOW (ref 34.5–45)
HGB BLD-MCNC: 10.3 G/DL — LOW (ref 11.5–15.5)
MCHC RBC-ENTMCNC: 29.7 PG — SIGNIFICANT CHANGE UP (ref 27–34)
MCHC RBC-ENTMCNC: 32.1 % — SIGNIFICANT CHANGE UP (ref 32–36)
MCV RBC AUTO: 92.5 FL — SIGNIFICANT CHANGE UP (ref 80–100)
NRBC # FLD: 0 — SIGNIFICANT CHANGE UP
PLATELET # BLD AUTO: 353 K/UL — SIGNIFICANT CHANGE UP (ref 150–400)
PMV BLD: 8.8 FL — SIGNIFICANT CHANGE UP (ref 7–13)
POTASSIUM SERPL-MCNC: 4.4 MMOL/L — SIGNIFICANT CHANGE UP (ref 3.5–5.3)
POTASSIUM SERPL-SCNC: 4.4 MMOL/L — SIGNIFICANT CHANGE UP (ref 3.5–5.3)
RBC # BLD: 3.47 M/UL — LOW (ref 3.8–5.2)
RBC # FLD: 12.4 % — SIGNIFICANT CHANGE UP (ref 10.3–14.5)
RH IG SCN BLD-IMP: POSITIVE — SIGNIFICANT CHANGE UP
SODIUM SERPL-SCNC: 144 MMOL/L — SIGNIFICANT CHANGE UP (ref 135–145)
WBC # BLD: 8.11 K/UL — SIGNIFICANT CHANGE UP (ref 3.8–10.5)
WBC # FLD AUTO: 8.11 K/UL — SIGNIFICANT CHANGE UP (ref 3.8–10.5)

## 2017-11-13 PROCEDURE — 88311 DECALCIFY TISSUE: CPT | Mod: 26

## 2017-11-13 PROCEDURE — 88304 TISSUE EXAM BY PATHOLOGIST: CPT | Mod: 26

## 2017-11-13 PROCEDURE — 72170 X-RAY EXAM OF PELVIS: CPT | Mod: 26

## 2017-11-13 PROCEDURE — 27130 TOTAL HIP ARTHROPLASTY: CPT | Mod: RT

## 2017-11-13 RX ORDER — SODIUM CHLORIDE 9 MG/ML
1000 INJECTION, SOLUTION INTRAVENOUS
Qty: 0 | Refills: 0 | Status: DISCONTINUED | OUTPATIENT
Start: 2017-11-13 | End: 2017-11-13

## 2017-11-13 RX ORDER — DEXAMETHASONE 0.5 MG/5ML
4 ELIXIR ORAL EVERY 6 HOURS
Qty: 0 | Refills: 0 | Status: DISCONTINUED | OUTPATIENT
Start: 2017-11-13 | End: 2017-11-14

## 2017-11-13 RX ORDER — SENNA PLUS 8.6 MG/1
2 TABLET ORAL AT BEDTIME
Qty: 0 | Refills: 0 | Status: DISCONTINUED | OUTPATIENT
Start: 2017-11-13 | End: 2017-11-14

## 2017-11-13 RX ORDER — DOCUSATE SODIUM 100 MG
100 CAPSULE ORAL THREE TIMES A DAY
Qty: 0 | Refills: 0 | Status: DISCONTINUED | OUTPATIENT
Start: 2017-11-13 | End: 2017-11-14

## 2017-11-13 RX ORDER — ONDANSETRON 8 MG/1
4 TABLET, FILM COATED ORAL EVERY 6 HOURS
Qty: 0 | Refills: 0 | Status: DISCONTINUED | OUTPATIENT
Start: 2017-11-13 | End: 2017-11-13

## 2017-11-13 RX ORDER — TRAMADOL HYDROCHLORIDE 50 MG/1
50 TABLET ORAL EVERY 8 HOURS
Qty: 0 | Refills: 0 | Status: DISCONTINUED | OUTPATIENT
Start: 2017-11-13 | End: 2017-11-14

## 2017-11-13 RX ORDER — ASPIRIN/CALCIUM CARB/MAGNESIUM 324 MG
325 TABLET ORAL
Qty: 0 | Refills: 0 | Status: DISCONTINUED | OUTPATIENT
Start: 2017-11-13 | End: 2017-11-14

## 2017-11-13 RX ORDER — ALPRAZOLAM 0.25 MG
2 TABLET ORAL
Qty: 0 | Refills: 0 | Status: DISCONTINUED | OUTPATIENT
Start: 2017-11-13 | End: 2017-11-14

## 2017-11-13 RX ORDER — MORPHINE SULFATE 50 MG/1
4 CAPSULE, EXTENDED RELEASE ORAL ONCE
Qty: 0 | Refills: 0 | Status: DISCONTINUED | OUTPATIENT
Start: 2017-11-13 | End: 2017-11-14

## 2017-11-13 RX ORDER — ACETAMINOPHEN 500 MG
650 TABLET ORAL EVERY 6 HOURS
Qty: 0 | Refills: 0 | Status: DISCONTINUED | OUTPATIENT
Start: 2017-11-13 | End: 2017-11-14

## 2017-11-13 RX ORDER — SODIUM CHLORIDE 9 MG/ML
1000 INJECTION INTRAMUSCULAR; INTRAVENOUS; SUBCUTANEOUS ONCE
Qty: 0 | Refills: 0 | Status: COMPLETED | OUTPATIENT
Start: 2017-11-13 | End: 2017-11-13

## 2017-11-13 RX ORDER — OXYCODONE HYDROCHLORIDE 5 MG/1
5 TABLET ORAL EVERY 4 HOURS
Qty: 0 | Refills: 0 | Status: DISCONTINUED | OUTPATIENT
Start: 2017-11-13 | End: 2017-11-14

## 2017-11-13 RX ORDER — CEFAZOLIN SODIUM 1 G
2000 VIAL (EA) INJECTION EVERY 8 HOURS
Qty: 0 | Refills: 0 | Status: COMPLETED | OUTPATIENT
Start: 2017-11-13 | End: 2017-11-14

## 2017-11-13 RX ORDER — OXYCODONE HYDROCHLORIDE 5 MG/1
10 TABLET ORAL EVERY 4 HOURS
Qty: 0 | Refills: 0 | Status: DISCONTINUED | OUTPATIENT
Start: 2017-11-13 | End: 2017-11-14

## 2017-11-13 RX ORDER — OXYCODONE HYDROCHLORIDE 5 MG/1
10 TABLET ORAL
Qty: 0 | Refills: 0 | Status: DISCONTINUED | OUTPATIENT
Start: 2017-11-13 | End: 2017-11-14

## 2017-11-13 RX ORDER — DEXAMETHASONE 0.5 MG/5ML
10 ELIXIR ORAL ONCE
Qty: 0 | Refills: 0 | Status: COMPLETED | OUTPATIENT
Start: 2017-11-14 | End: 2017-11-14

## 2017-11-13 RX ORDER — OXYCODONE HYDROCHLORIDE 5 MG/1
5 TABLET ORAL
Qty: 0 | Refills: 0 | Status: DISCONTINUED | OUTPATIENT
Start: 2017-11-13 | End: 2017-11-14

## 2017-11-13 RX ORDER — VENLAFAXINE HCL 75 MG
225 CAPSULE, EXT RELEASE 24 HR ORAL AT BEDTIME
Qty: 0 | Refills: 0 | Status: DISCONTINUED | OUTPATIENT
Start: 2017-11-13 | End: 2017-11-14

## 2017-11-13 RX ORDER — MORPHINE SULFATE 50 MG/1
0.15 CAPSULE, EXTENDED RELEASE ORAL ONCE
Qty: 0 | Refills: 0 | Status: DISCONTINUED | OUTPATIENT
Start: 2017-11-13 | End: 2017-11-14

## 2017-11-13 RX ORDER — NALOXONE HYDROCHLORIDE 4 MG/.1ML
0.1 SPRAY NASAL
Qty: 0 | Refills: 0 | Status: DISCONTINUED | OUTPATIENT
Start: 2017-11-13 | End: 2017-11-14

## 2017-11-13 RX ORDER — HYDROMORPHONE HYDROCHLORIDE 2 MG/ML
1 INJECTION INTRAMUSCULAR; INTRAVENOUS; SUBCUTANEOUS
Qty: 0 | Refills: 0 | Status: DISCONTINUED | OUTPATIENT
Start: 2017-11-13 | End: 2017-11-14

## 2017-11-13 RX ORDER — TRAMADOL HYDROCHLORIDE 50 MG/1
50 TABLET ORAL THREE TIMES A DAY
Qty: 0 | Refills: 0 | Status: DISCONTINUED | OUTPATIENT
Start: 2017-11-13 | End: 2017-11-13

## 2017-11-13 RX ORDER — ONDANSETRON 8 MG/1
4 TABLET, FILM COATED ORAL EVERY 6 HOURS
Qty: 0 | Refills: 0 | Status: DISCONTINUED | OUTPATIENT
Start: 2017-11-13 | End: 2017-11-14

## 2017-11-13 RX ORDER — PANTOPRAZOLE SODIUM 20 MG/1
40 TABLET, DELAYED RELEASE ORAL DAILY
Qty: 0 | Refills: 0 | Status: DISCONTINUED | OUTPATIENT
Start: 2017-11-13 | End: 2017-11-14

## 2017-11-13 RX ORDER — KETOROLAC TROMETHAMINE 30 MG/ML
15 SYRINGE (ML) INJECTION EVERY 8 HOURS
Qty: 0 | Refills: 0 | Status: DISCONTINUED | OUTPATIENT
Start: 2017-11-13 | End: 2017-11-14

## 2017-11-13 RX ORDER — DIPHENHYDRAMINE HCL 50 MG
12.5 CAPSULE ORAL EVERY 4 HOURS
Qty: 0 | Refills: 0 | Status: DISCONTINUED | OUTPATIENT
Start: 2017-11-13 | End: 2017-11-14

## 2017-11-13 RX ORDER — SODIUM CHLORIDE 9 MG/ML
1000 INJECTION INTRAMUSCULAR; INTRAVENOUS; SUBCUTANEOUS ONCE
Qty: 0 | Refills: 0 | Status: COMPLETED | OUTPATIENT
Start: 2017-11-14 | End: 2017-11-14

## 2017-11-13 RX ORDER — SODIUM CHLORIDE 9 MG/ML
1000 INJECTION, SOLUTION INTRAVENOUS
Qty: 0 | Refills: 0 | Status: DISCONTINUED | OUTPATIENT
Start: 2017-11-13 | End: 2017-11-14

## 2017-11-13 RX ORDER — CELECOXIB 200 MG/1
200 CAPSULE ORAL
Qty: 0 | Refills: 0 | Status: DISCONTINUED | OUTPATIENT
Start: 2017-11-15 | End: 2017-11-14

## 2017-11-13 RX ORDER — QUETIAPINE FUMARATE 200 MG/1
600 TABLET, FILM COATED ORAL AT BEDTIME
Qty: 0 | Refills: 0 | Status: DISCONTINUED | OUTPATIENT
Start: 2017-11-13 | End: 2017-11-14

## 2017-11-13 RX ORDER — POLYETHYLENE GLYCOL 3350 17 G/17G
17 POWDER, FOR SOLUTION ORAL DAILY
Qty: 0 | Refills: 0 | Status: DISCONTINUED | OUTPATIENT
Start: 2017-11-13 | End: 2017-11-14

## 2017-11-13 RX ADMIN — Medication 975 MILLIGRAM(S): at 11:40

## 2017-11-13 RX ADMIN — Medication 2 MILLIGRAM(S): at 20:50

## 2017-11-13 RX ADMIN — Medication 225 MILLIGRAM(S): at 22:52

## 2017-11-13 RX ADMIN — TRAMADOL HYDROCHLORIDE 50 MILLIGRAM(S): 50 TABLET ORAL at 22:52

## 2017-11-13 RX ADMIN — Medication 100 MILLIGRAM(S): at 22:53

## 2017-11-13 RX ADMIN — QUETIAPINE FUMARATE 600 MILLIGRAM(S): 200 TABLET, FILM COATED ORAL at 22:52

## 2017-11-13 RX ADMIN — SENNA PLUS 2 TABLET(S): 8.6 TABLET ORAL at 22:53

## 2017-11-13 RX ADMIN — Medication 325 MILLIGRAM(S): at 19:24

## 2017-11-13 RX ADMIN — TRAMADOL HYDROCHLORIDE 50 MILLIGRAM(S): 50 TABLET ORAL at 11:40

## 2017-11-13 RX ADMIN — Medication 15 MILLIGRAM(S): at 22:52

## 2017-11-13 RX ADMIN — SODIUM CHLORIDE 3 MILLILITER(S): 9 INJECTION INTRAMUSCULAR; INTRAVENOUS; SUBCUTANEOUS at 22:53

## 2017-11-13 RX ADMIN — GABAPENTIN 300 MILLIGRAM(S): 400 CAPSULE ORAL at 11:41

## 2017-11-13 RX ADMIN — Medication 650 MILLIGRAM(S): at 19:26

## 2017-11-13 RX ADMIN — SODIUM CHLORIDE 1000 MILLILITER(S): 9 INJECTION INTRAMUSCULAR; INTRAVENOUS; SUBCUTANEOUS at 19:57

## 2017-11-13 RX ADMIN — PANTOPRAZOLE SODIUM 40 MILLIGRAM(S): 20 TABLET, DELAYED RELEASE ORAL at 11:40

## 2017-11-13 RX ADMIN — Medication 2 MILLIGRAM(S): at 17:17

## 2017-11-13 NOTE — CHART NOTE - NSCHARTNOTEFT_GEN_A_CORE
Ortho Post-Op Check    Pt seen and examined. Doing well. Pain controlled.  No complaints.    Vital Signs Last 24 Hrs  T(C): 36.8 (13 Nov 2017 22:02), Max: 36.9 (13 Nov 2017 19:42)  T(F): 98.3 (13 Nov 2017 22:02), Max: 98.5 (13 Nov 2017 19:42)  HR: 89 (13 Nov 2017 22:02) (69 - 90)  BP: 104/55 (13 Nov 2017 22:02) (104/55 - 126/81)  BP(mean): --  RR: 18 (13 Nov 2017 22:02) (16 - 22)  SpO2: 97% (13 Nov 2017 22:02) (95% - 100%)    Comfortable, NAD  RLE hip dressing C/D/I  EHL/FHL/TA/GS intact  SILT SP/DP/T/S/S  WWP brisk cap refill  Compartments soft and compressible    52F s/p R ARA  - WBAT RLE  - Anterior hip precautions  - PT/OOB  - Pain control  - ASA BID for dvt ppx

## 2017-11-13 NOTE — BRIEF OPERATIVE NOTE - PROCEDURE
<<-----Click on this checkbox to enter Procedure Arthroplasty of right hip by anterior approach  11/13/2017    Active  FAIZA

## 2017-11-14 ENCOUNTER — TRANSCRIPTION ENCOUNTER (OUTPATIENT)
Age: 52
End: 2017-11-14

## 2017-11-14 VITALS
DIASTOLIC BLOOD PRESSURE: 57 MMHG | OXYGEN SATURATION: 97 % | HEART RATE: 86 BPM | TEMPERATURE: 98 F | RESPIRATION RATE: 17 BRPM | SYSTOLIC BLOOD PRESSURE: 108 MMHG

## 2017-11-14 DIAGNOSIS — D62 ACUTE POSTHEMORRHAGIC ANEMIA: ICD-10-CM

## 2017-11-14 DIAGNOSIS — Z96.641 PRESENCE OF RIGHT ARTIFICIAL HIP JOINT: ICD-10-CM

## 2017-11-14 DIAGNOSIS — Z29.9 ENCOUNTER FOR PROPHYLACTIC MEASURES, UNSPECIFIED: ICD-10-CM

## 2017-11-14 LAB
BUN SERPL-MCNC: 10 MG/DL — SIGNIFICANT CHANGE UP (ref 7–23)
CALCIUM SERPL-MCNC: 8.5 MG/DL — SIGNIFICANT CHANGE UP (ref 8.4–10.5)
CHLORIDE SERPL-SCNC: 103 MMOL/L — SIGNIFICANT CHANGE UP (ref 98–107)
CO2 SERPL-SCNC: 28 MMOL/L — SIGNIFICANT CHANGE UP (ref 22–31)
CREAT SERPL-MCNC: 0.81 MG/DL — SIGNIFICANT CHANGE UP (ref 0.5–1.3)
GLUCOSE SERPL-MCNC: 112 MG/DL — HIGH (ref 70–99)
HCT VFR BLD CALC: 28.3 % — LOW (ref 34.5–45)
HGB BLD-MCNC: 9.2 G/DL — LOW (ref 11.5–15.5)
MCHC RBC-ENTMCNC: 30.3 PG — SIGNIFICANT CHANGE UP (ref 27–34)
MCHC RBC-ENTMCNC: 32.5 % — SIGNIFICANT CHANGE UP (ref 32–36)
MCV RBC AUTO: 93.1 FL — SIGNIFICANT CHANGE UP (ref 80–100)
NRBC # FLD: 0 — SIGNIFICANT CHANGE UP
PLATELET # BLD AUTO: 320 K/UL — SIGNIFICANT CHANGE UP (ref 150–400)
PMV BLD: 9.4 FL — SIGNIFICANT CHANGE UP (ref 7–13)
POTASSIUM SERPL-MCNC: 4.4 MMOL/L — SIGNIFICANT CHANGE UP (ref 3.5–5.3)
POTASSIUM SERPL-SCNC: 4.4 MMOL/L — SIGNIFICANT CHANGE UP (ref 3.5–5.3)
RBC # BLD: 3.04 M/UL — LOW (ref 3.8–5.2)
RBC # FLD: 12.1 % — SIGNIFICANT CHANGE UP (ref 10.3–14.5)
SODIUM SERPL-SCNC: 141 MMOL/L — SIGNIFICANT CHANGE UP (ref 135–145)
WBC # BLD: 7.14 K/UL — SIGNIFICANT CHANGE UP (ref 3.8–10.5)
WBC # FLD AUTO: 7.14 K/UL — SIGNIFICANT CHANGE UP (ref 3.8–10.5)

## 2017-11-14 PROCEDURE — 99239 HOSP IP/OBS DSCHRG MGMT >30: CPT

## 2017-11-14 PROCEDURE — 99223 1ST HOSP IP/OBS HIGH 75: CPT

## 2017-11-14 RX ORDER — VENLAFAXINE HCL 75 MG
3 CAPSULE, EXT RELEASE 24 HR ORAL
Qty: 0 | Refills: 0 | COMMUNITY
Start: 2017-11-14

## 2017-11-14 RX ORDER — TRAMADOL HYDROCHLORIDE 50 MG/1
1 TABLET ORAL
Qty: 21 | Refills: 0 | OUTPATIENT
Start: 2017-11-14 | End: 2017-11-21

## 2017-11-14 RX ORDER — QUETIAPINE FUMARATE 200 MG/1
2 TABLET, FILM COATED ORAL
Qty: 0 | Refills: 0 | COMMUNITY
Start: 2017-11-14

## 2017-11-14 RX ORDER — TRAMADOL HYDROCHLORIDE 50 MG/1
50 TABLET ORAL EVERY 8 HOURS
Qty: 0 | Refills: 0 | Status: DISCONTINUED | OUTPATIENT
Start: 2017-11-14 | End: 2017-11-14

## 2017-11-14 RX ORDER — MELOXICAM 15 MG/1
1 TABLET ORAL
Qty: 30 | Refills: 0 | OUTPATIENT
Start: 2017-11-14 | End: 2017-12-14

## 2017-11-14 RX ORDER — FAMOTIDINE 10 MG/ML
1 INJECTION INTRAVENOUS
Qty: 90 | Refills: 0 | OUTPATIENT
Start: 2017-11-14 | End: 2017-12-29

## 2017-11-14 RX ORDER — ALPRAZOLAM 0.25 MG
0 TABLET ORAL
Qty: 0 | Refills: 0 | COMMUNITY

## 2017-11-14 RX ORDER — TRAMADOL HYDROCHLORIDE 50 MG/1
1 TABLET ORAL
Qty: 0 | Refills: 0 | COMMUNITY

## 2017-11-14 RX ORDER — SENNA PLUS 8.6 MG/1
2 TABLET ORAL
Qty: 0 | Refills: 0 | COMMUNITY
Start: 2017-11-14

## 2017-11-14 RX ORDER — ALPRAZOLAM 0.25 MG
1 TABLET ORAL
Qty: 0 | Refills: 0 | COMMUNITY
Start: 2017-11-14

## 2017-11-14 RX ORDER — DOCUSATE SODIUM 100 MG
1 CAPSULE ORAL
Qty: 0 | Refills: 0 | COMMUNITY
Start: 2017-11-14

## 2017-11-14 RX ORDER — CYCLOBENZAPRINE HYDROCHLORIDE 10 MG/1
5 TABLET, FILM COATED ORAL ONCE
Qty: 0 | Refills: 0 | Status: COMPLETED | OUTPATIENT
Start: 2017-11-14 | End: 2017-11-14

## 2017-11-14 RX ORDER — ASPIRIN/CALCIUM CARB/MAGNESIUM 324 MG
1 TABLET ORAL
Qty: 84 | Refills: 0 | OUTPATIENT
Start: 2017-11-14 | End: 2017-12-26

## 2017-11-14 RX ORDER — VENLAFAXINE HCL 75 MG
0 CAPSULE, EXT RELEASE 24 HR ORAL
Qty: 0 | Refills: 0 | COMMUNITY

## 2017-11-14 RX ORDER — QUETIAPINE FUMARATE 200 MG/1
0 TABLET, FILM COATED ORAL
Qty: 0 | Refills: 0 | COMMUNITY

## 2017-11-14 RX ORDER — MORPHINE SULFATE 50 MG/1
2 CAPSULE, EXTENDED RELEASE ORAL EVERY 4 HOURS
Qty: 0 | Refills: 0 | Status: DISCONTINUED | OUTPATIENT
Start: 2017-11-14 | End: 2017-11-14

## 2017-11-14 RX ORDER — KETOROLAC TROMETHAMINE 30 MG/ML
15 SYRINGE (ML) INJECTION EVERY 6 HOURS
Qty: 0 | Refills: 0 | Status: COMPLETED | OUTPATIENT
Start: 2017-11-14 | End: 2017-11-15

## 2017-11-14 RX ADMIN — SODIUM CHLORIDE 3 MILLILITER(S): 9 INJECTION INTRAMUSCULAR; INTRAVENOUS; SUBCUTANEOUS at 05:54

## 2017-11-14 RX ADMIN — Medication 2 MILLIGRAM(S): at 12:10

## 2017-11-14 RX ADMIN — SODIUM CHLORIDE 150 MILLILITER(S): 9 INJECTION, SOLUTION INTRAVENOUS at 06:00

## 2017-11-14 RX ADMIN — CYCLOBENZAPRINE HYDROCHLORIDE 5 MILLIGRAM(S): 10 TABLET, FILM COATED ORAL at 12:10

## 2017-11-14 RX ADMIN — PANTOPRAZOLE SODIUM 40 MILLIGRAM(S): 20 TABLET, DELAYED RELEASE ORAL at 11:53

## 2017-11-14 RX ADMIN — SODIUM CHLORIDE 1000 MILLILITER(S): 9 INJECTION INTRAMUSCULAR; INTRAVENOUS; SUBCUTANEOUS at 06:00

## 2017-11-14 RX ADMIN — OXYCODONE HYDROCHLORIDE 10 MILLIGRAM(S): 5 TABLET ORAL at 06:00

## 2017-11-14 RX ADMIN — TRAMADOL HYDROCHLORIDE 50 MILLIGRAM(S): 50 TABLET ORAL at 13:56

## 2017-11-14 RX ADMIN — OXYCODONE HYDROCHLORIDE 10 MILLIGRAM(S): 5 TABLET ORAL at 11:24

## 2017-11-14 RX ADMIN — Medication 102 MILLIGRAM(S): at 07:30

## 2017-11-14 RX ADMIN — Medication 650 MILLIGRAM(S): at 00:45

## 2017-11-14 RX ADMIN — Medication 100 MILLIGRAM(S): at 13:56

## 2017-11-14 RX ADMIN — TRAMADOL HYDROCHLORIDE 50 MILLIGRAM(S): 50 TABLET ORAL at 06:00

## 2017-11-14 RX ADMIN — OXYCODONE HYDROCHLORIDE 10 MILLIGRAM(S): 5 TABLET ORAL at 12:20

## 2017-11-14 RX ADMIN — Medication 15 MILLIGRAM(S): at 06:04

## 2017-11-14 RX ADMIN — Medication 325 MILLIGRAM(S): at 06:04

## 2017-11-14 RX ADMIN — OXYCODONE HYDROCHLORIDE 10 MILLIGRAM(S): 5 TABLET ORAL at 06:45

## 2017-11-14 RX ADMIN — Medication 100 MILLIGRAM(S): at 06:08

## 2017-11-14 RX ADMIN — SODIUM CHLORIDE 3 MILLILITER(S): 9 INJECTION INTRAMUSCULAR; INTRAVENOUS; SUBCUTANEOUS at 14:58

## 2017-11-14 RX ADMIN — Medication 15 MILLIGRAM(S): at 11:53

## 2017-11-14 RX ADMIN — POLYETHYLENE GLYCOL 3350 17 GRAM(S): 17 POWDER, FOR SOLUTION ORAL at 11:52

## 2017-11-14 RX ADMIN — Medication 100 MILLIGRAM(S): at 06:05

## 2017-11-14 RX ADMIN — Medication 650 MILLIGRAM(S): at 06:04

## 2017-11-14 RX ADMIN — Medication 650 MILLIGRAM(S): at 11:53

## 2017-11-14 NOTE — PROGRESS NOTE ADULT - SUBJECTIVE AND OBJECTIVE BOX
ANESTHESIA POSTOP CHECK    52y Female POSTOP DAY 1     No COMPLAINTS    NO APPARENT ANESTHESIA COMPLICATIONS

## 2017-11-14 NOTE — PROGRESS NOTE ADULT - SUBJECTIVE AND OBJECTIVE BOX
ANESTHESIA POSTOP CHECK    52y Female POSTOP DAY 1 S/P Right Hip replacement    [x ] NO APPARENT ANESTHESIA COMPLICATIONS      Comments:

## 2017-11-14 NOTE — CONSULT NOTE ADULT - PROBLEM SELECTOR RECOMMENDATION 9
Pain well controlled; continue management and pain control per ortho recs with celebrex, toradol, morphine IV prn, tramadol tid and oxycodone IR prn.

## 2017-11-14 NOTE — DISCHARGE NOTE ADULT - PLAN OF CARE
pain control-> pain med may cause drowsiness, refrain from activities require decision making; physical therapy to help resume activities of daily living Office appointment is already made (see card attached to discharge folder) so if you need to reschedule please call Dr. Altamirano's office [phone]   weight bearing as tolerated to Right leg  TOTAL HIP PRECAUTIONS ANTERIOR

## 2017-11-14 NOTE — DISCHARGE NOTE ADULT - ADDITIONAL INSTRUCTIONS
post surgical care  55yo female is s/p Right total hip arthroplasty 11/13/2017 without any intraoperative complications.  Patient is doing well and stable for discharge.  Patient is tolerating physical therapy: weight bearing to Right leg, gait training, STRICT ANTERIOR HIP PRECAUTIONS.  If applicable, keep Aquacel dressing on as is until day of staple removal.  Staples/sutures to be removed 14 days from date of surgery.  Patient is on Aspirin (MUST TAKE WITH FOOD) for anticoagulation.  Orthopaedic Surgeon Dr. Altamirano would like patient to call private MD/Internist for appointment postop to maintain continuity of care.  Follow up with Dr. Altamirano's office in 1-2 weeks.  Istop reviewed Reference #: [id number]

## 2017-11-14 NOTE — PROGRESS NOTE ADULT - SUBJECTIVE AND OBJECTIVE BOX
Ortho Progress Note  NELLY CONTRERAS   MRN-[id number]    52yFemale is s/p R DA ARA POD#1 w/out any c/o.  Resting comfortably, NAD, ANO x 3    Vital Signs Last 24 Hrs  T(C): 36.6 (14 Nov 2017 05:54), Max: 36.9 (13 Nov 2017 19:42)  T(F): 97.8 (14 Nov 2017 05:54), Max: 98.5 (13 Nov 2017 19:42)  HR: 78 (14 Nov 2017 05:54) (69 - 90)  BP: 103/53 (14 Nov 2017 05:54) (103/53 - 126/81)  BP(mean): --  RR: 18 (14 Nov 2017 05:54) (16 - 22)  SpO2: 95% (14 Nov 2017 05:54) (95% - 100%)  No Known Allergies      S/P R ARA postop outer hip dressing C/D/I  R hip wound w aquacel dressing is C/D/I  motor BLE EHL, GS, TA intact  sensation BLE intact to light touch                          10.3   8.11  )-----------( 353      ( 13 Nov 2017 20:55 )             32.1     11-13    144  |  104  |  11  ----------------------------<  116<H>  4.4   |  29  |  0.84    Ca    8.9      13 Nov 2017 17:00          A/P Ortho Stable s/p R DA ARA POD#1  ck Labs this am  continue Aspirin for anticoagulation   continue Physical Therapy BID: WBAT, TOTAL HIP PRECAUTIONS ANTERIOR  discharge planning today when pt is cleared by physical therapy for safe home discharge Ortho Progress Note  NELLY CONTRERAS   MRN-[id number]    52yFemale is s/p R DA ARA POD#1 w/out any c/o.  Resting comfortably, NAD, ANO x 3    Vital Signs Last 24 Hrs  T(C): 36.6 (14 Nov 2017 05:54), Max: 36.9 (13 Nov 2017 19:42)  T(F): 97.8 (14 Nov 2017 05:54), Max: 98.5 (13 Nov 2017 19:42)  HR: 78 (14 Nov 2017 05:54) (69 - 90)  BP: 103/53 (14 Nov 2017 05:54) (103/53 - 126/81)  BP(mean): --  RR: 18 (14 Nov 2017 05:54) (16 - 22)  SpO2: 95% (14 Nov 2017 05:54) (95% - 100%)  No Known Allergies      S/P R ARA postop outer hip dressing C/D/I - removed  R hip wound w aquacel dressing is C/D/I  motor BLE EHL, GS, TA intact  sensation BLE intact to light touch                          10.3   8.11  )-----------( 353      ( 13 Nov 2017 20:55 )             32.1     11-13    144  |  104  |  11  ----------------------------<  116<H>  4.4   |  29  |  0.84    Ca    8.9      13 Nov 2017 17:00          A/P Ortho Stable s/p R DA ARA POD#1  ck Labs this am  continue Aspirin for anticoagulation   continue Physical Therapy BID: WBAT, TOTAL HIP PRECAUTIONS ANTERIOR  discharge planning today when pt is cleared by physical therapy for safe home discharge

## 2017-11-14 NOTE — DISCHARGE NOTE ADULT - CONDITIONS AT DISCHARGE
Stable, right hip aquacel dressing in place clean dry and intact, oob without difficulty, voiding well, tolerating diet

## 2017-11-14 NOTE — OCCUPATIONAL THERAPY INITIAL EVALUATION ADULT - PERTINENT HX OF CURRENT PROBLEM, REHAB EVAL
Pt is a 52 year old female who is s/p right hip arthroscopy, labral repair on 8/14/17, with increasing right hip pain and dysfunction. Pt reports right hip weakness, instability falls at home. Left hip is also painful but not as much. Xray and MRI of right hip completed and referred for surgical evaluation. Pt with diagnosis of unilateral primary osteoarthritis, right hip. Pt is now s/p right total hip replacement direct anterior approach for 11/13/17.

## 2017-11-14 NOTE — DISCHARGE NOTE ADULT - PATIENT PORTAL LINK FT
“You can access the [employer] Patient Portal, offered by [facility], by registering with the following website: [email or web address]”

## 2017-11-14 NOTE — OCCUPATIONAL THERAPY INITIAL EVALUATION ADULT - MD ORDER
Occupational Therapy (OT) to evaluate and treat. Occupational Therapy (OT) to evaluate and treat. Out of bed to Chair. Ambulate with walker. Ambulate as Tolerated.

## 2017-11-14 NOTE — DISCHARGE NOTE ADULT - HOSPITAL COURSE
53yo female is s/p Right total hip arthroplasty 11/13/2017 without any intraoperative complications.  Patient is doing well and stable for discharge.  Patient is tolerating physical therapy: weight bearing to Right leg, gait training, STRICT ANTERIOR HIP PRECAUTIONS.  If applicable, keep Aquacel dressing on as is until day of staple removal.  Staples/sutures to be removed 14 days from date of surgery.  Patient is on Aspirin (MUST TAKE WITH FOOD) for anticoagulation.  Orthopaedic Surgeon Dr. Altamirano would like patient to call private MD/Internist for appointment postop to maintain continuity of care.  Follow up with Dr. Altamirano's office in 1-2 weeks.    Istop reviewed Reference #: [id number]

## 2017-11-14 NOTE — DISCHARGE NOTE ADULT - MEDICATION SUMMARY - MEDICATIONS TO CHANGE
I will SWITCH the dose or number of times a day I take the medications listed below when I get home from the hospital:    traMADol 200 mg/24 hours oral tablet, extended release  -- 1 tab(s) by mouth once a day, As Needed

## 2017-11-14 NOTE — DISCHARGE NOTE ADULT - CARE PROVIDER_API CALL
Tahir Altamirano), Orthopaedic Surgery  [address] [address]  Phone: [phone]  Fax: [phone]

## 2017-11-14 NOTE — CONSULT NOTE ADULT - SUBJECTIVE AND OBJECTIVE BOX
CHIEF COMPLAINT: Patient is a 52y old  Female who presents with a chief complaint of elective Right total hip arthroplasty 11/13/2017 (14 Nov 2017 06:26)    HPI: 52F h/o anemia, bipolar depression presents with diagnosis of unilateral primary osteoarthritis, right hip scheduled for right total hip replacement direct anterior approach for 11/13/17. Pt is s/p right hip arthroscopy, labral repair on 8/14/17, with increasing right hip pain and dysfunction. Pt reports right hip weakness, instability falls at home. Left hip is also painful but not as much. Xray and MRI of right hip completed and referred for surgical evaluation. Patient seen and examined. No acute events overnight. Pain well controlled and patient without any complaints.     Pain Symptoms if applicable:             	                         none	   mild         moderate         severe  	                            0	    1-3	     4-6	         7-10  Pain: 3  Location: right hip	  Modifying factors: right leg pain	  Associated symptoms: pain with walking	    Allergies: No Known Allergies    HOME MEDICATIONS: [x] Reviewed    MEDICATIONS  (STANDING):  acetaminophen   Tablet 650 milliGRAM(s) Oral every 6 hours  ALPRAZolam 2 milliGRAM(s) Oral two times a day  aspirin enteric coated 325 milliGRAM(s) Oral two times a day  cyclobenzaprine 5 milliGRAM(s) Oral once  docusate sodium 100 milliGRAM(s) Oral three times a day  ketorolac   Injectable 15 milliGRAM(s) IV Push every 6 hours  lactated ringers. 1000 milliLiter(s) (150 mL/Hr) IV Continuous <Continuous>  pantoprazole    Tablet 40 milliGRAM(s) Oral daily  polyethylene glycol 3350 17 Gram(s) Oral daily  QUEtiapine 600 milliGRAM(s) Oral at bedtime  senna 2 Tablet(s) Oral at bedtime  sodium chloride 0.9% lock flush 3 milliLiter(s) IV Push every 8 hours  traMADol 50 milliGRAM(s) Oral every 8 hours  venlafaxine 225 milliGRAM(s) Oral at bedtime    MEDICATIONS  (PRN):  acetaminophen   Tablet 650 milliGRAM(s) Oral every 6 hours PRN For Temp over 38.3 C (100.94 F)  aluminum hydroxide/magnesium hydroxide/simethicone Suspension 30 milliLiter(s) Oral four times a day PRN Indigestion  morphine  - Injectable 2 milliGRAM(s) IV Push every 4 hours PRN breakthrough pain  naloxone Injectable 0.1 milliGRAM(s) IV Push every 3 minutes PRN For ANY of the following changes in patient status:  A. RR LESS THAN 10 breaths per minute, B. Oxygen saturation LESS THAN 90%, C. Sedation score of 6  oxyCODONE    IR 5 milliGRAM(s) Oral every 4 hours PRN mild and moderate pain  oxyCODONE    IR 10 milliGRAM(s) Oral every 4 hours PRN Severe Pain (7 - 10)    PAST MEDICAL & SURGICAL HISTORY:  Anemia  Unilateral primary osteoarthritis, right hip  Other specified joint disorders, unspecified hip  Bipolar depression  Appendicitis: 1996  H/O arthroscopy: Right hip, labral repair 8/14/17  History of appendectomy  H/O dilation and curettage  H/O colonoscopy  [x ] Reviewed     SOCIAL HISTORY:  [x] Substance abuse: caffeine  [x] Tobacco: never  [x] Alcohol use: never    FAMILY HISTORY:  Family history of schizophrenia (Sibling)  Family history of depression (Mother)    REVIEW OF SYSTEMS:  [x] All other ROS negative      Vital Signs Last 24 Hrs  T(C): 36.5 (14 Nov 2017 09:16), Max: 36.9 (13 Nov 2017 19:42)  T(F): 97.7 (14 Nov 2017 09:16), Max: 98.5 (13 Nov 2017 19:42)  HR: 82 (14 Nov 2017 09:16) (69 - 90)  BP: 118/75 (14 Nov 2017 09:16) (103/53 - 126/81)  BP(mean): --  RR: 17 (14 Nov 2017 09:16) (16 - 22)  SpO2: 99% (14 Nov 2017 09:16) (95% - 100%)    PHYSICAL EXAM:  GENERAL: NAD, well-groomed, well-developed  HEAD:  Atraumatic, Normocephalic  EYES: EOMI, PERRLA, conjunctiva and sclera clear  ENMT: Moist mucous membranes  NECK: Supple, No JVD  RESPIRATORY: Clear to auscultation bilaterally; No rales, rhonchi, wheezing, or rubs  CARDIOVASCULAR: Regular rate and rhythm; No murmurs, rubs, or gallops  GASTROINTESTINAL: Soft, Nontender, Nondistended; Bowel sounds present  GENITOURINARY: Not examined  EXTREMITIES:  2+ Peripheral Pulses, No clubbing, cyanosis, or edema  NERVOUS SYSTEM:  Alert & Oriented X3; Moving all 4 extremities; No gross sensory deficits  HEME/LYMPH: No lymphadenopathy noted  SKIN: right hip incision C/D/I    LABS:                        9.2    7.14  )-----------( 320      ( 14 Nov 2017 05:27 )             28.3     Hemoglobin: 9.2 g/dL (11-14 @ 05:27)  Hemoglobin: 10.3 g/dL (11-13 @ 20:55)    11-14    141  |  103  |  10  ----------------------------<  112<H>  4.4   |  28  |  0.81    Ca    8.5      14 Nov 2017 05:27    RADIOLOGY & ADDITIONAL STUDIES:    Imaging:   Personally Reviewed:  [x] YES   < from: Xray Pelvis AP only (11.13.17 @ 16:38) >  IMPRESSION:  Cementless right total hip prosthesis implanted.    Intact and aligned hardware and no periprosthetic fractures.    Focal small ovoid ossification adjacent to the lateral right acetabular   articular margin. Postoperative soft tissue changes.    Mild left hip osteoarthritic changes.    Correlate with intraoperative findings.    < end of copied text >              [ ] Consultant(s) Notes Reviewed  [x] Care Discussed with Consultants/Other Providers: Ortho PA - discussed disposition

## 2017-11-14 NOTE — DISCHARGE NOTE ADULT - CARE PLAN
Principal Discharge DX:	Primary osteoarthritis of right hip  Goal:	pain control-> pain med may cause drowsiness, refrain from activities require decision making; physical therapy to help resume activities of daily living  Instructions for follow-up, activity and diet:	Office appointment is already made (see card attached to discharge folder) so if you need to reschedule please call Dr. Altamirano's office [phone]   weight bearing as tolerated to Right leg  TOTAL HIP PRECAUTIONS ANTERIOR

## 2017-11-14 NOTE — PROGRESS NOTE ADULT - SUBJECTIVE AND OBJECTIVE BOX
Anesthesia Pain Management Service    SUBJECTIVE: Patient s/p spinal morphine with pain managable and no problems.  Pain Scale Score:  Refer to charted pain scores    THERAPY:    s/p spinal PF morphine yesterday.      MEDICATIONS  (STANDING):  acetaminophen   Tablet 650 milliGRAM(s) Oral every 6 hours  ALPRAZolam 2 milliGRAM(s) Oral two times a day  aspirin enteric coated 325 milliGRAM(s) Oral two times a day  docusate sodium 100 milliGRAM(s) Oral three times a day  ketorolac   Injectable 15 milliGRAM(s) IV Push every 6 hours  lactated ringers. 1000 milliLiter(s) (150 mL/Hr) IV Continuous <Continuous>  pantoprazole    Tablet 40 milliGRAM(s) Oral daily  polyethylene glycol 3350 17 Gram(s) Oral daily  QUEtiapine 600 milliGRAM(s) Oral at bedtime  senna 2 Tablet(s) Oral at bedtime  sodium chloride 0.9% lock flush 3 milliLiter(s) IV Push every 8 hours  traMADol 50 milliGRAM(s) Oral every 8 hours  venlafaxine 225 milliGRAM(s) Oral at bedtime    MEDICATIONS  (PRN):  acetaminophen   Tablet 650 milliGRAM(s) Oral every 6 hours PRN For Temp over 38.3 C (100.94 F)  aluminum hydroxide/magnesium hydroxide/simethicone Suspension 30 milliLiter(s) Oral four times a day PRN Indigestion  morphine  - Injectable 2 milliGRAM(s) IV Push every 4 hours PRN breakthrough pain  naloxone Injectable 0.1 milliGRAM(s) IV Push every 3 minutes PRN For ANY of the following changes in patient status:  A. RR LESS THAN 10 breaths per minute, B. Oxygen saturation LESS THAN 90%, C. Sedation score of 6  oxyCODONE    IR 5 milliGRAM(s) Oral every 4 hours PRN mild and moderate pain  oxyCODONE    IR 10 milliGRAM(s) Oral every 4 hours PRN Severe Pain (7 - 10)      OBJECTIVE:    Sedation Score:	[ x] Alert	[ ] Drowsy	[ ] Arousable	[ ] Asleep	[ ] Unresponsive    Side Effects:	[ x] None	[ ] Nausea	[ ] Vomiting	[ ] Pruritus  		  [ ] Weakness		[ ] Numbness	[ ] Other:    Vital Signs Last 24 Hrs  T(C): 36.5 (14 Nov 2017 09:16), Max: 36.9 (13 Nov 2017 19:42)  T(F): 97.7 (14 Nov 2017 09:16), Max: 98.5 (13 Nov 2017 19:42)  HR: 82 (14 Nov 2017 09:16) (69 - 90)  BP: 118/75 (14 Nov 2017 09:16) (103/53 - 126/81)  BP(mean): --  RR: 17 (14 Nov 2017 09:16) (16 - 22)  SpO2: 99% (14 Nov 2017 09:16) (95% - 100%)    ASSESSMENT/ PLAN  [x ] Patient transitioned to prn analgesics  [x] Pain management per primary service, pain service to sign off   [x]Documentation and Verification of current medications     Comments: PRN opioids or adjuvant medication to be given.

## 2017-11-14 NOTE — OCCUPATIONAL THERAPY INITIAL EVALUATION ADULT - LIVES WITH, PROFILE
Pt. reports she lives with her sister in an apartment building with 1 step to enter. Once inside, pt. reports she has no steps to negotiate and apartment is located on the main level. Per pt., she has a bathtub in her bathroom.

## 2017-11-14 NOTE — OCCUPATIONAL THERAPY INITIAL EVALUATION ADULT - LEVEL OF INDEPENDENCE: SIT/SUPINE, REHAB EVAL
Not assessed. Pt left sitting in Hip Chair in Rehab gym with rehab aide present. Pt handed off in no acute distress. All lines intact and all precautions maintained. RN aware.

## 2017-11-14 NOTE — PROGRESS NOTE ADULT - SUBJECTIVE AND OBJECTIVE BOX
Anesthesia Pain Management Service    SUBJECTIVE: Patient s/p spinal morphine with pain managable and no problems.  Pain Scale Score:  Refer to charted pain scores    THERAPY:    s/p spinal PF morphine yesterday.      MEDICATIONS  (STANDING):  acetaminophen   Tablet 650 milliGRAM(s) Oral every 6 hours  ALPRAZolam 2 milliGRAM(s) Oral two times a day  aspirin enteric coated 325 milliGRAM(s) Oral two times a day  cyclobenzaprine 5 milliGRAM(s) Oral once  docusate sodium 100 milliGRAM(s) Oral three times a day  ketorolac   Injectable 15 milliGRAM(s) IV Push every 6 hours  lactated ringers. 1000 milliLiter(s) (150 mL/Hr) IV Continuous <Continuous>  pantoprazole    Tablet 40 milliGRAM(s) Oral daily  polyethylene glycol 3350 17 Gram(s) Oral daily  QUEtiapine 600 milliGRAM(s) Oral at bedtime  senna 2 Tablet(s) Oral at bedtime  sodium chloride 0.9% lock flush 3 milliLiter(s) IV Push every 8 hours  traMADol 50 milliGRAM(s) Oral every 8 hours  venlafaxine 225 milliGRAM(s) Oral at bedtime    MEDICATIONS  (PRN):  acetaminophen   Tablet 650 milliGRAM(s) Oral every 6 hours PRN For Temp over 38.3 C (100.94 F)  aluminum hydroxide/magnesium hydroxide/simethicone Suspension 30 milliLiter(s) Oral four times a day PRN Indigestion  morphine  - Injectable 2 milliGRAM(s) IV Push every 4 hours PRN breakthrough pain  naloxone Injectable 0.1 milliGRAM(s) IV Push every 3 minutes PRN For ANY of the following changes in patient status:  A. RR LESS THAN 10 breaths per minute, B. Oxygen saturation LESS THAN 90%, C. Sedation score of 6  oxyCODONE    IR 5 milliGRAM(s) Oral every 4 hours PRN mild and moderate pain  oxyCODONE    IR 10 milliGRAM(s) Oral every 4 hours PRN Severe Pain (7 - 10)      OBJECTIVE:     Sedation Score:	[ x] Alert	[ ] Drowsy	[ ] Arousable	[ ] Asleep	[ ] Unresponsive    Side Effects:	[ x] None	[ ] Nausea	[ ] Vomiting	[ ] Pruritus  		  [ ] Weakness		[ ] Numbness	[ ] Other:    Vital Signs Last 24 Hrs  T(C): 36.5 (14 Nov 2017 09:16), Max: 36.9 (13 Nov 2017 19:42)  T(F): 97.7 (14 Nov 2017 09:16), Max: 98.5 (13 Nov 2017 19:42)  HR: 82 (14 Nov 2017 09:16) (69 - 90)  BP: 118/75 (14 Nov 2017 09:16) (103/53 - 126/81)  BP(mean): --  RR: 17 (14 Nov 2017 09:16) (16 - 22)  SpO2: 99% (14 Nov 2017 09:16) (95% - 100%)    ASSESSMENT/ PLAN  [x ] Patient transitioned to prn analgesics  [x] Pain management per primary service, pain service to sign off   [x]Documentation and Verification of current medications     Comments: PRN opioids or adjuvant medication to be given.

## 2017-11-14 NOTE — DISCHARGE NOTE ADULT - INSTRUCTIONS
resume same diet as prior to surgery You have a postop appointment with Dr Altamirano on 11/30/2017 @ 10;30 am, please keep aquacel dressing in place until this appointment.  Notify Dr Altamirano if you experience any increase in pain not relieved with pain medication, any redness, drainage or swelling around incision or a fever >101.0  Maintain anterior hip precautions, continue to do exercises as instructed.  Drink plenty of fluids.  use over the counter stool softeners to assist with constipation which can be a side effect of your pain medication.

## 2017-11-14 NOTE — OCCUPATIONAL THERAPY INITIAL EVALUATION ADULT - GENERAL OBSERVATIONS, REHAB EVAL
Pt. received sitting in Hip Chair in Rehab gym. No acute distress. +Clean dry intact dressing to Right Hip, +Heplock.

## 2017-11-14 NOTE — DISCHARGE NOTE ADULT - MEDICATION SUMMARY - MEDICATIONS TO TAKE
I will START or STAY ON the medications listed below when I get home from the hospital:    Mobic 15 mg oral tablet  -- 1 tab(s) by mouth once a day (take with food)  -- Do not take this drug if you are pregnant.  Obtain medical advice before taking any non-prescription drugs as some may affect the action of this medication.  Take with food or milk.    -- Indication: For Anti inflammatory    Percocet 5/325 oral tablet  -- 1-2 tab(s) by mouth every 6 hours AS NEEDED for moderate to severe pain  begin tapering off as pain decreases  MDD:EIGHT TABS  -- Caution federal law prohibits the transfer of this drug to any person other  than the person for whom it was prescribed.  May cause drowsiness.  Alcohol may intensify this effect.  Use care when operating dangerous machinery.  This prescription cannot be refilled.  This product contains acetaminophen.  Do not use  with any other product containing acetaminophen to prevent possible liver damage.  Using more of this medication than prescribed may cause serious breathing problems.    -- Indication: For Pain control    traMADol 50 mg oral tablet  -- 1 tab(s) by mouth every 8 hours take regularly (baseline control of mild pain)  begin tapering off as pain decreases  MDD:THREE TAB  -- Indication: For Pain control    aspirin 325 mg oral delayed release tablet  -- 1 tab(s) by mouth 2 times a day (with meals)   -- Indication: For blood thinner MUST TAKE WITH FOOD    venlafaxine 75 mg oral tablet  -- 3 tab(s) by mouth once a day (at bedtime)  -- Indication: For Home med    QUEtiapine 300 mg oral tablet  -- 2 tab(s) by mouth once a day (at bedtime)  -- Indication: For Home med    ALPRAZolam 2 mg oral tablet  -- 1 tab(s) by mouth 2 times a day  -- Indication: For Home med    Pepcid 20 mg oral tablet  -- 1 tab(s) by mouth 2 times a day   -- It is very important that you take or use this exactly as directed.  Do not skip doses or discontinue unless directed by your doctor.  Obtain medical advice before taking any non-prescription drugs as some may affect the action of this medication.    -- Indication: For stomach protectant while on aspirin    senna oral tablet  -- 2 tab(s) by mouth once a day (at bedtime)  over the counter for constipation from pain meds   -- Indication: For stool stimulant    docusate sodium 100 mg oral capsule  -- 1 cap(s) by mouth 3 times a day  over the counter for constipation from pain meds   -- Indication: For stool softener

## 2017-11-17 ENCOUNTER — EMERGENCY (EMERGENCY)
Facility: HOSPITAL | Age: 52
LOS: 0 days | Discharge: HOME | End: 2017-11-17
Attending: INTERNAL MEDICINE
Payer: COMMERCIAL

## 2017-11-17 VITALS
DIASTOLIC BLOOD PRESSURE: 72 MMHG | WEIGHT: 149.91 LBS | RESPIRATION RATE: 17 BRPM | TEMPERATURE: 98 F | HEART RATE: 88 BPM | HEIGHT: 66 IN | SYSTOLIC BLOOD PRESSURE: 121 MMHG | OXYGEN SATURATION: 98 %

## 2017-11-17 VITALS
RESPIRATION RATE: 19 BRPM | HEART RATE: 66 BPM | OXYGEN SATURATION: 97 % | TEMPERATURE: 98 F | DIASTOLIC BLOOD PRESSURE: 70 MMHG | SYSTOLIC BLOOD PRESSURE: 108 MMHG

## 2017-11-17 DIAGNOSIS — Z90.49 ACQUIRED ABSENCE OF OTHER SPECIFIED PARTS OF DIGESTIVE TRACT: Chronic | ICD-10-CM

## 2017-11-17 DIAGNOSIS — Z98.890 OTHER SPECIFIED POSTPROCEDURAL STATES: Chronic | ICD-10-CM

## 2017-11-17 LAB
ALBUMIN SERPL ELPH-MCNC: 2.9 G/DL — LOW (ref 3.3–5)
ALP SERPL-CCNC: 97 U/L — SIGNIFICANT CHANGE UP (ref 40–120)
ALT FLD-CCNC: 36 U/L — SIGNIFICANT CHANGE UP (ref 12–78)
ANION GAP SERPL CALC-SCNC: 7 MMOL/L — SIGNIFICANT CHANGE UP (ref 5–17)
ANISOCYTOSIS BLD QL: SLIGHT — SIGNIFICANT CHANGE UP
APPEARANCE UR: CLEAR — SIGNIFICANT CHANGE UP
APTT BLD: 32.6 SEC — SIGNIFICANT CHANGE UP (ref 27.5–37.4)
AST SERPL-CCNC: 65 U/L — HIGH (ref 15–37)
BILIRUB SERPL-MCNC: 0.3 MG/DL — SIGNIFICANT CHANGE UP (ref 0.2–1.2)
BILIRUB UR-MCNC: NEGATIVE — SIGNIFICANT CHANGE UP
BUN SERPL-MCNC: 12 MG/DL — SIGNIFICANT CHANGE UP (ref 7–23)
CALCIUM SERPL-MCNC: 9 MG/DL — SIGNIFICANT CHANGE UP (ref 8.5–10.1)
CHLORIDE SERPL-SCNC: 102 MMOL/L — SIGNIFICANT CHANGE UP (ref 96–108)
CO2 SERPL-SCNC: 30 MMOL/L — SIGNIFICANT CHANGE UP (ref 22–31)
COLOR SPEC: YELLOW — SIGNIFICANT CHANGE UP
CREAT SERPL-MCNC: 0.82 MG/DL — SIGNIFICANT CHANGE UP (ref 0.5–1.3)
DIFF PNL FLD: NEGATIVE — SIGNIFICANT CHANGE UP
ERYTHROCYTE [SEDIMENTATION RATE] IN BLOOD: 105 MM/HR — HIGH (ref 0–20)
GLUCOSE SERPL-MCNC: 100 MG/DL — HIGH (ref 70–99)
GLUCOSE UR QL: NEGATIVE MG/DL — SIGNIFICANT CHANGE UP
HCG UR QL: NEGATIVE — SIGNIFICANT CHANGE UP
HCT VFR BLD CALC: 30.2 % — LOW (ref 34.5–45)
HGB BLD-MCNC: 10.2 G/DL — LOW (ref 11.5–15.5)
INR BLD: 1.01 RATIO — SIGNIFICANT CHANGE UP (ref 0.88–1.16)
KETONES UR-MCNC: NEGATIVE — SIGNIFICANT CHANGE UP
LACTATE SERPL-SCNC: 1.3 MMOL/L — SIGNIFICANT CHANGE UP (ref 0.7–2)
LEUKOCYTE ESTERASE UR-ACNC: NEGATIVE — SIGNIFICANT CHANGE UP
LYMPHOCYTES # BLD AUTO: 34 % — SIGNIFICANT CHANGE UP (ref 13–44)
MCHC RBC-ENTMCNC: 30.9 PG — SIGNIFICANT CHANGE UP (ref 27–34)
MCHC RBC-ENTMCNC: 33.7 GM/DL — SIGNIFICANT CHANGE UP (ref 32–36)
MCV RBC AUTO: 91.5 FL — SIGNIFICANT CHANGE UP (ref 80–100)
MICROCYTES BLD QL: SLIGHT — SIGNIFICANT CHANGE UP
MONOCYTES NFR BLD AUTO: 6 % — SIGNIFICANT CHANGE UP (ref 2–14)
NEUTROPHILS NFR BLD AUTO: 58 % — SIGNIFICANT CHANGE UP (ref 43–77)
NEUTS BAND # BLD: 2 % — SIGNIFICANT CHANGE UP (ref 0–8)
NITRITE UR-MCNC: NEGATIVE — SIGNIFICANT CHANGE UP
PH UR: 7 — SIGNIFICANT CHANGE UP (ref 5–8)
PLAT MORPH BLD: NORMAL — SIGNIFICANT CHANGE UP
PLATELET # BLD AUTO: 432 K/UL — HIGH (ref 150–400)
POLYCHROMASIA BLD QL SMEAR: SLIGHT — SIGNIFICANT CHANGE UP
POTASSIUM SERPL-MCNC: 4.2 MMOL/L — SIGNIFICANT CHANGE UP (ref 3.5–5.3)
POTASSIUM SERPL-SCNC: 4.2 MMOL/L — SIGNIFICANT CHANGE UP (ref 3.5–5.3)
PROT SERPL-MCNC: 7.5 GM/DL — SIGNIFICANT CHANGE UP (ref 6–8.3)
PROT UR-MCNC: NEGATIVE MG/DL — SIGNIFICANT CHANGE UP
PROTHROM AB SERPL-ACNC: 11 SEC — SIGNIFICANT CHANGE UP (ref 9.8–12.7)
RBC # BLD: 3.3 M/UL — LOW (ref 3.8–5.2)
RBC # FLD: 12 % — SIGNIFICANT CHANGE UP (ref 11–15)
RBC BLD AUTO: ABNORMAL
SODIUM SERPL-SCNC: 139 MMOL/L — SIGNIFICANT CHANGE UP (ref 135–145)
SP GR SPEC: 1 — LOW (ref 1.01–1.02)
UROBILINOGEN FLD QL: NEGATIVE MG/DL — SIGNIFICANT CHANGE UP
WBC # BLD: 6.1 K/UL — SIGNIFICANT CHANGE UP (ref 3.8–10.5)
WBC # FLD AUTO: 6.1 K/UL — SIGNIFICANT CHANGE UP (ref 3.8–10.5)

## 2017-11-17 PROCEDURE — 70487 CT MAXILLOFACIAL W/DYE: CPT | Mod: 26

## 2017-11-17 PROCEDURE — 73502 X-RAY EXAM HIP UNI 2-3 VIEWS: CPT | Mod: 26,RT

## 2017-11-17 PROCEDURE — 99285 EMERGENCY DEPT VISIT HI MDM: CPT

## 2017-11-17 RX ORDER — SODIUM CHLORIDE 9 MG/ML
3 INJECTION INTRAMUSCULAR; INTRAVENOUS; SUBCUTANEOUS ONCE
Qty: 0 | Refills: 0 | Status: COMPLETED | OUTPATIENT
Start: 2017-11-17 | End: 2017-11-17

## 2017-11-17 RX ORDER — OXYCODONE AND ACETAMINOPHEN 5; 325 MG/1; MG/1
2 TABLET ORAL ONCE
Qty: 0 | Refills: 0 | Status: DISCONTINUED | OUTPATIENT
Start: 2017-11-17 | End: 2017-11-17

## 2017-11-17 RX ORDER — VANCOMYCIN HCL 1 G
1000 VIAL (EA) INTRAVENOUS ONCE
Qty: 0 | Refills: 0 | Status: COMPLETED | OUTPATIENT
Start: 2017-11-17 | End: 2017-11-17

## 2017-11-17 RX ORDER — OXYCODONE HYDROCHLORIDE 5 MG/1
1 TABLET ORAL
Qty: 10 | Refills: 0 | OUTPATIENT
Start: 2017-11-17 | End: 2017-11-22

## 2017-11-17 RX ORDER — AMPICILLIN SODIUM AND SULBACTAM SODIUM 250; 125 MG/ML; MG/ML
3 INJECTION, POWDER, FOR SUSPENSION INTRAMUSCULAR; INTRAVENOUS EVERY 6 HOURS
Qty: 0 | Refills: 0 | Status: DISCONTINUED | OUTPATIENT
Start: 2017-11-17 | End: 2017-11-17

## 2017-11-17 RX ORDER — AMPICILLIN SODIUM AND SULBACTAM SODIUM 250; 125 MG/ML; MG/ML
INJECTION, POWDER, FOR SUSPENSION INTRAMUSCULAR; INTRAVENOUS
Qty: 0 | Refills: 0 | Status: DISCONTINUED | OUTPATIENT
Start: 2017-11-17 | End: 2017-11-17

## 2017-11-17 RX ORDER — SODIUM CHLORIDE 9 MG/ML
1000 INJECTION INTRAMUSCULAR; INTRAVENOUS; SUBCUTANEOUS
Qty: 0 | Refills: 0 | Status: DISCONTINUED | OUTPATIENT
Start: 2017-11-17 | End: 2017-11-17

## 2017-11-17 RX ORDER — AMPICILLIN SODIUM AND SULBACTAM SODIUM 250; 125 MG/ML; MG/ML
3 INJECTION, POWDER, FOR SUSPENSION INTRAMUSCULAR; INTRAVENOUS ONCE
Qty: 0 | Refills: 0 | Status: COMPLETED | OUTPATIENT
Start: 2017-11-17 | End: 2017-11-17

## 2017-11-17 RX ADMIN — AMPICILLIN SODIUM AND SULBACTAM SODIUM 200 GRAM(S): 250; 125 INJECTION, POWDER, FOR SUSPENSION INTRAMUSCULAR; INTRAVENOUS at 13:00

## 2017-11-17 RX ADMIN — OXYCODONE AND ACETAMINOPHEN 2 TABLET(S): 5; 325 TABLET ORAL at 15:35

## 2017-11-17 RX ADMIN — SODIUM CHLORIDE 100 MILLILITER(S): 9 INJECTION INTRAMUSCULAR; INTRAVENOUS; SUBCUTANEOUS at 13:10

## 2017-11-17 RX ADMIN — SODIUM CHLORIDE 3 MILLILITER(S): 9 INJECTION INTRAMUSCULAR; INTRAVENOUS; SUBCUTANEOUS at 12:48

## 2017-11-17 RX ADMIN — Medication 250 MILLIGRAM(S): at 14:06

## 2017-11-17 RX ADMIN — OXYCODONE AND ACETAMINOPHEN 2 TABLET(S): 5; 325 TABLET ORAL at 13:00

## 2017-11-17 NOTE — ED PROVIDER NOTE - OBJECTIVE STATEMENT
HPI, PMHx, PSHX, FHX, SocialHx as contained herein:   patient  ha severe pain in right Hip are after recent Rt THR ( 4 days ago in [facility]),  c/p pain and 10/10, and swelling in operative site. Some chills. denies fever. she also has dome purulent drainage from Lft side of her mouth , where she has a caries Molar  tooth.. No N/V/D. No dyspnea. No SOB. No chest or abd pain. no calf pains. No ankle edema  . PMHX Bipolar disorder, caries teeth, PSHx; Past labara surgery  in rt hip, and recent THR for Bone on norman in [facility]. ( Did not go to rehab)  FhX  N/c Social hx. Denies alcohol, tobacco or drug use. . Disabled because of the hip, lives alone.

## 2017-11-17 NOTE — ED ADULT TRIAGE NOTE - CHIEF COMPLAINT QUOTE
Pain right hip she had a hip replacement on  Monday discharge home  Wednesday. Hip and leg warm and red to the touch

## 2017-11-17 NOTE — ED ADULT NURSE NOTE - [id number]_[id number]_ED
I concur with the Admission Order and I certify that services are provided in accordance with Section 42 CFR § 412.3
No

## 2017-11-17 NOTE — ED PROVIDER NOTE - PLAN OF CARE
accerlerate resolution, Take antibiotics as prescribed. take pain meds . Follow up with Othopedic physician, dr Adarsh holloway on monday

## 2017-11-17 NOTE — ED PROVIDER NOTE - ENMT, MLM
Airway patent, Nasal mucosa clear. Mouth with normal mucosa. Throat has no vesicles, no oropharyngeal exudates and uvula is midline. The last molar tooth Left upper  has caries. There is no obvious swelling or discharge.  There is no trismus ,  patient can swallow normally

## 2017-11-17 NOTE — ED PROVIDER NOTE - MUSCULOSKELETAL, MLM
Spine appears normal, range of motion is not limited, no muscle or joint tenderness. the post op wound is closed, No discharge. there is some edema around the wound, and  there is some echymotic aquiles on the lat ascpect of the rt thigh. normal ROM of the Rt HIP.  Thee is no tendernes to the calf  and there is no edema of the ankle. distal pulses are intact.

## 2017-11-17 NOTE — ED PROVIDER NOTE - CONSTITUTIONAL, MLM
normal... Well appearing, well nourished, awake, alert, oriented to person, place, time/situation and in no apparent distress.. looks more emotionally distressed than from pain

## 2017-11-17 NOTE — ED PROVIDER NOTE - CARE PLAN
Principal Discharge DX:	Acute sinusitis  Goal:	accerlerate resolution,  Instructions for follow-up, activity and diet:	Take antibiotics as prescribed. take pain meds . Follow up with Othopedic physician, dr Adarsh holloway on monday  Secondary Diagnosis:	Pain  Secondary Diagnosis:	Bipolar disorder

## 2017-11-18 LAB
CULTURE RESULTS: SIGNIFICANT CHANGE UP
SPECIMEN SOURCE: SIGNIFICANT CHANGE UP

## 2017-11-19 DIAGNOSIS — M16.11 UNILATERAL PRIMARY OSTEOARTHRITIS, RIGHT HIP: ICD-10-CM

## 2017-11-19 DIAGNOSIS — K02.9 DENTAL CARIES, UNSPECIFIED: ICD-10-CM

## 2017-11-19 DIAGNOSIS — F31.9 BIPOLAR DISORDER, UNSPECIFIED: ICD-10-CM

## 2017-11-19 DIAGNOSIS — J01.90 ACUTE SINUSITIS, UNSPECIFIED: ICD-10-CM

## 2017-11-19 DIAGNOSIS — D64.9 ANEMIA, UNSPECIFIED: ICD-10-CM

## 2017-11-19 DIAGNOSIS — Z79.82 LONG TERM (CURRENT) USE OF ASPIRIN: ICD-10-CM

## 2017-11-19 DIAGNOSIS — M25.551 PAIN IN RIGHT HIP: ICD-10-CM

## 2017-11-19 DIAGNOSIS — Z87.891 PERSONAL HISTORY OF NICOTINE DEPENDENCE: ICD-10-CM

## 2017-11-20 ENCOUNTER — RX RENEWAL (OUTPATIENT)
Age: 52
End: 2017-11-20

## 2017-11-20 ENCOUNTER — TRANSCRIPTION ENCOUNTER (OUTPATIENT)
Age: 52
End: 2017-11-20

## 2017-11-20 LAB — SURGICAL PATHOLOGY STUDY: SIGNIFICANT CHANGE UP

## 2017-11-20 RX ORDER — OXYCODONE HYDROCHLORIDE 20 MG/1
20 TABLET, FILM COATED, EXTENDED RELEASE ORAL
Qty: 40 | Refills: 0 | Status: ACTIVE | COMMUNITY
Start: 2017-11-20 | End: 1900-01-01

## 2017-11-22 ENCOUNTER — RX RENEWAL (OUTPATIENT)
Age: 52
End: 2017-11-22

## 2017-11-22 LAB
CULTURE RESULTS: SIGNIFICANT CHANGE UP
CULTURE RESULTS: SIGNIFICANT CHANGE UP
SPECIMEN SOURCE: SIGNIFICANT CHANGE UP
SPECIMEN SOURCE: SIGNIFICANT CHANGE UP

## 2017-11-24 ENCOUNTER — APPOINTMENT (OUTPATIENT)
Dept: ORTHOPEDIC SURGERY | Facility: CLINIC | Age: 52
End: 2017-11-24

## 2017-12-01 ENCOUNTER — APPOINTMENT (OUTPATIENT)
Dept: ORTHOPEDIC SURGERY | Facility: CLINIC | Age: 52
End: 2017-12-01
Payer: MEDICAID

## 2017-12-01 VITALS
SYSTOLIC BLOOD PRESSURE: 103 MMHG | WEIGHT: 145 LBS | HEIGHT: 60 IN | DIASTOLIC BLOOD PRESSURE: 64 MMHG | HEART RATE: 96 BPM | BODY MASS INDEX: 28.47 KG/M2

## 2017-12-01 DIAGNOSIS — Z98.890 OTHER SPECIFIED POSTPROCEDURAL STATES: ICD-10-CM

## 2017-12-01 PROCEDURE — 73502 X-RAY EXAM HIP UNI 2-3 VIEWS: CPT | Mod: RT

## 2017-12-01 PROCEDURE — 99024 POSTOP FOLLOW-UP VISIT: CPT

## 2017-12-01 RX ORDER — OXYCODONE AND ACETAMINOPHEN 5; 325 MG/1; MG/1
5-325 TABLET ORAL
Qty: 60 | Refills: 0 | Status: ACTIVE | COMMUNITY
Start: 2017-12-01 | End: 1900-01-01

## 2017-12-01 RX ORDER — GABAPENTIN 100 MG/1
100 CAPSULE ORAL 3 TIMES DAILY
Qty: 180 | Refills: 0 | Status: ACTIVE | COMMUNITY
Start: 2017-12-01 | End: 1900-01-01

## 2017-12-01 RX ORDER — MELOXICAM 15 MG/1
15 TABLET ORAL
Qty: 30 | Refills: 1 | Status: ACTIVE | COMMUNITY
Start: 2017-12-01 | End: 1900-01-01

## 2017-12-06 ENCOUNTER — RX RENEWAL (OUTPATIENT)
Age: 52
End: 2017-12-06

## 2017-12-06 RX ORDER — TRAMADOL HYDROCHLORIDE 50 MG/1
50 TABLET, COATED ORAL
Qty: 90 | Refills: 0 | Status: ACTIVE | COMMUNITY
Start: 2017-12-06 | End: 1900-01-01

## 2017-12-14 ENCOUNTER — RX RENEWAL (OUTPATIENT)
Age: 52
End: 2017-12-14

## 2017-12-14 RX ORDER — OXYCODONE AND ACETAMINOPHEN 5; 325 MG/1; MG/1
5-325 TABLET ORAL
Qty: 60 | Refills: 0 | Status: ACTIVE | COMMUNITY
Start: 2017-12-14 | End: 1900-01-01

## 2018-01-03 ENCOUNTER — APPOINTMENT (OUTPATIENT)
Dept: PHYSICAL MEDICINE AND REHAB | Facility: CLINIC | Age: 53
End: 2018-01-03

## 2018-01-09 ENCOUNTER — EMERGENCY (EMERGENCY)
Facility: HOSPITAL | Age: 53
LOS: 0 days | Discharge: HOME | End: 2018-01-09
Attending: EMERGENCY MEDICINE
Payer: MEDICAID

## 2018-01-09 VITALS
HEART RATE: 74 BPM | OXYGEN SATURATION: 100 % | RESPIRATION RATE: 18 BRPM | SYSTOLIC BLOOD PRESSURE: 125 MMHG | DIASTOLIC BLOOD PRESSURE: 85 MMHG | TEMPERATURE: 98 F

## 2018-01-09 VITALS
HEART RATE: 77 BPM | HEIGHT: 65 IN | SYSTOLIC BLOOD PRESSURE: 139 MMHG | TEMPERATURE: 98 F | WEIGHT: 139.99 LBS | RESPIRATION RATE: 20 BRPM | OXYGEN SATURATION: 100 % | DIASTOLIC BLOOD PRESSURE: 86 MMHG

## 2018-01-09 DIAGNOSIS — R07.9 CHEST PAIN, UNSPECIFIED: ICD-10-CM

## 2018-01-09 DIAGNOSIS — D64.9 ANEMIA, UNSPECIFIED: ICD-10-CM

## 2018-01-09 DIAGNOSIS — Z90.49 ACQUIRED ABSENCE OF OTHER SPECIFIED PARTS OF DIGESTIVE TRACT: Chronic | ICD-10-CM

## 2018-01-09 DIAGNOSIS — Z98.890 OTHER SPECIFIED POSTPROCEDURAL STATES: Chronic | ICD-10-CM

## 2018-01-09 DIAGNOSIS — F31.9 BIPOLAR DISORDER, UNSPECIFIED: ICD-10-CM

## 2018-01-09 DIAGNOSIS — M16.11 UNILATERAL PRIMARY OSTEOARTHRITIS, RIGHT HIP: ICD-10-CM

## 2018-01-09 LAB
ALBUMIN SERPL ELPH-MCNC: 3.7 G/DL — SIGNIFICANT CHANGE UP (ref 3.3–5)
ALP SERPL-CCNC: 95 U/L — SIGNIFICANT CHANGE UP (ref 40–120)
ALT FLD-CCNC: 50 U/L — SIGNIFICANT CHANGE UP (ref 12–78)
AMPHET UR-MCNC: NEGATIVE — SIGNIFICANT CHANGE UP
ANION GAP SERPL CALC-SCNC: 8 MMOL/L — SIGNIFICANT CHANGE UP (ref 5–17)
APAP SERPL-MCNC: 3 UG/ML — LOW (ref 10–30)
APPEARANCE UR: CLEAR — SIGNIFICANT CHANGE UP
APTT BLD: 30.3 SEC — SIGNIFICANT CHANGE UP (ref 27.5–37.4)
AST SERPL-CCNC: 50 U/L — HIGH (ref 15–37)
BACTERIA # UR AUTO: ABNORMAL
BARBITURATES UR SCN-MCNC: NEGATIVE — SIGNIFICANT CHANGE UP
BASOPHILS # BLD AUTO: 0.1 K/UL — SIGNIFICANT CHANGE UP (ref 0–0.2)
BASOPHILS NFR BLD AUTO: 1.4 % — SIGNIFICANT CHANGE UP (ref 0–2)
BENZODIAZ UR-MCNC: POSITIVE — SIGNIFICANT CHANGE UP
BILIRUB SERPL-MCNC: 0.5 MG/DL — SIGNIFICANT CHANGE UP (ref 0.2–1.2)
BILIRUB UR-MCNC: NEGATIVE — SIGNIFICANT CHANGE UP
BUN SERPL-MCNC: 9 MG/DL — SIGNIFICANT CHANGE UP (ref 7–23)
CALCIUM SERPL-MCNC: 9.1 MG/DL — SIGNIFICANT CHANGE UP (ref 8.5–10.1)
CHLORIDE SERPL-SCNC: 100 MMOL/L — SIGNIFICANT CHANGE UP (ref 96–108)
CK MB BLD-MCNC: <0.3 % — SIGNIFICANT CHANGE UP (ref 0–3.5)
CK MB CFR SERPL CALC: <0.5 NG/ML — SIGNIFICANT CHANGE UP (ref 0.5–3.6)
CK SERPL-CCNC: 170 U/L — SIGNIFICANT CHANGE UP (ref 26–192)
CO2 SERPL-SCNC: 28 MMOL/L — SIGNIFICANT CHANGE UP (ref 22–31)
COCAINE METAB.OTHER UR-MCNC: NEGATIVE — SIGNIFICANT CHANGE UP
COLOR SPEC: YELLOW — SIGNIFICANT CHANGE UP
CREAT SERPL-MCNC: 0.84 MG/DL — SIGNIFICANT CHANGE UP (ref 0.5–1.3)
DIFF PNL FLD: NEGATIVE — SIGNIFICANT CHANGE UP
EOSINOPHIL # BLD AUTO: 0 K/UL — SIGNIFICANT CHANGE UP (ref 0–0.5)
EOSINOPHIL NFR BLD AUTO: 0.4 % — SIGNIFICANT CHANGE UP (ref 0–6)
EPI CELLS # UR: SIGNIFICANT CHANGE UP
ETHANOL SERPL-MCNC: <10 MG/DL — SIGNIFICANT CHANGE UP (ref 0–10)
FLUAV SPEC QL CULT: NEGATIVE — SIGNIFICANT CHANGE UP
FLUBV AG SPEC QL IA: NEGATIVE — SIGNIFICANT CHANGE UP
GLUCOSE SERPL-MCNC: 107 MG/DL — HIGH (ref 70–99)
GLUCOSE UR QL: NEGATIVE MG/DL — SIGNIFICANT CHANGE UP
HCG SERPL-ACNC: 4 MIU/ML — SIGNIFICANT CHANGE UP
HCT VFR BLD CALC: 37.4 % — SIGNIFICANT CHANGE UP (ref 34.5–45)
HGB BLD-MCNC: 12.4 G/DL — SIGNIFICANT CHANGE UP (ref 11.5–15.5)
INR BLD: 1.03 RATIO — SIGNIFICANT CHANGE UP (ref 0.88–1.16)
KETONES UR-MCNC: ABNORMAL
LEUKOCYTE ESTERASE UR-ACNC: NEGATIVE — SIGNIFICANT CHANGE UP
LYMPHOCYTES # BLD AUTO: 1.6 K/UL — SIGNIFICANT CHANGE UP (ref 1–3.3)
LYMPHOCYTES # BLD AUTO: 32 % — SIGNIFICANT CHANGE UP (ref 13–44)
MCHC RBC-ENTMCNC: 29.3 PG — SIGNIFICANT CHANGE UP (ref 27–34)
MCHC RBC-ENTMCNC: 33.1 GM/DL — SIGNIFICANT CHANGE UP (ref 32–36)
MCV RBC AUTO: 88.5 FL — SIGNIFICANT CHANGE UP (ref 80–100)
METHADONE UR-MCNC: NEGATIVE — SIGNIFICANT CHANGE UP
MONOCYTES # BLD AUTO: 0.3 K/UL — SIGNIFICANT CHANGE UP (ref 0–0.9)
MONOCYTES NFR BLD AUTO: 5.6 % — SIGNIFICANT CHANGE UP (ref 2–14)
NEUTROPHILS # BLD AUTO: 3 K/UL — SIGNIFICANT CHANGE UP (ref 1.8–7.4)
NEUTROPHILS NFR BLD AUTO: 60.6 % — SIGNIFICANT CHANGE UP (ref 43–77)
NITRITE UR-MCNC: NEGATIVE — SIGNIFICANT CHANGE UP
OPIATES UR-MCNC: NEGATIVE — SIGNIFICANT CHANGE UP
PCP SPEC-MCNC: SIGNIFICANT CHANGE UP
PCP UR-MCNC: NEGATIVE — SIGNIFICANT CHANGE UP
PH UR: 6.5 — SIGNIFICANT CHANGE UP (ref 5–8)
PLATELET # BLD AUTO: 388 K/UL — SIGNIFICANT CHANGE UP (ref 150–400)
POTASSIUM SERPL-MCNC: 4 MMOL/L — SIGNIFICANT CHANGE UP (ref 3.5–5.3)
POTASSIUM SERPL-SCNC: 4 MMOL/L — SIGNIFICANT CHANGE UP (ref 3.5–5.3)
PROT SERPL-MCNC: 7.8 GM/DL — SIGNIFICANT CHANGE UP (ref 6–8.3)
PROT UR-MCNC: 15 MG/DL
PROTHROM AB SERPL-ACNC: 11.2 SEC — SIGNIFICANT CHANGE UP (ref 9.8–12.7)
RBC # BLD: 4.23 M/UL — SIGNIFICANT CHANGE UP (ref 3.8–5.2)
RBC # FLD: 13.1 % — SIGNIFICANT CHANGE UP (ref 11–15)
RBC CASTS # UR COMP ASSIST: SIGNIFICANT CHANGE UP /HPF (ref 0–4)
SALICYLATES SERPL-MCNC: <1.7 MG/DL — LOW (ref 2.8–20)
SODIUM SERPL-SCNC: 136 MMOL/L — SIGNIFICANT CHANGE UP (ref 135–145)
SP GR SPEC: 1.01 — SIGNIFICANT CHANGE UP (ref 1.01–1.02)
THC UR QL: NEGATIVE — SIGNIFICANT CHANGE UP
TROPONIN I SERPL-MCNC: <.015 NG/ML — SIGNIFICANT CHANGE UP (ref 0.01–0.04)
UROBILINOGEN FLD QL: NEGATIVE MG/DL — SIGNIFICANT CHANGE UP
WBC # BLD: 4.9 K/UL — SIGNIFICANT CHANGE UP (ref 3.8–10.5)
WBC # FLD AUTO: 4.9 K/UL — SIGNIFICANT CHANGE UP (ref 3.8–10.5)
WBC UR QL: SIGNIFICANT CHANGE UP

## 2018-01-09 PROCEDURE — 72170 X-RAY EXAM OF PELVIS: CPT | Mod: 26

## 2018-01-09 PROCEDURE — 71045 X-RAY EXAM CHEST 1 VIEW: CPT | Mod: 26

## 2018-01-09 PROCEDURE — 71275 CT ANGIOGRAPHY CHEST: CPT | Mod: 26

## 2018-01-09 PROCEDURE — 93970 EXTREMITY STUDY: CPT | Mod: 26

## 2018-01-09 PROCEDURE — 93010 ELECTROCARDIOGRAM REPORT: CPT

## 2018-01-09 PROCEDURE — 99285 EMERGENCY DEPT VISIT HI MDM: CPT

## 2018-01-09 RX ORDER — SODIUM CHLORIDE 9 MG/ML
3 INJECTION INTRAMUSCULAR; INTRAVENOUS; SUBCUTANEOUS ONCE
Qty: 0 | Refills: 0 | Status: COMPLETED | OUTPATIENT
Start: 2018-01-09 | End: 2018-01-09

## 2018-01-09 RX ORDER — OXYCODONE AND ACETAMINOPHEN 5; 325 MG/1; MG/1
1 TABLET ORAL ONCE
Qty: 0 | Refills: 0 | Status: DISCONTINUED | OUTPATIENT
Start: 2018-01-09 | End: 2018-01-09

## 2018-01-09 RX ORDER — QUETIAPINE FUMARATE 200 MG/1
3 TABLET, FILM COATED ORAL
Qty: 24 | Refills: 0 | OUTPATIENT
Start: 2018-01-09 | End: 2018-01-16

## 2018-01-09 RX ORDER — VENLAFAXINE HCL 75 MG
1 CAPSULE, EXT RELEASE 24 HR ORAL
Qty: 8 | Refills: 0 | OUTPATIENT
Start: 2018-01-09 | End: 2018-01-16

## 2018-01-09 RX ORDER — ALPRAZOLAM 0.25 MG
1 TABLET ORAL
Qty: 8 | Refills: 0 | OUTPATIENT
Start: 2018-01-09 | End: 2018-01-16

## 2018-01-09 RX ADMIN — OXYCODONE AND ACETAMINOPHEN 1 TABLET(S): 5; 325 TABLET ORAL at 16:48

## 2018-01-09 RX ADMIN — SODIUM CHLORIDE 3 MILLILITER(S): 9 INJECTION INTRAMUSCULAR; INTRAVENOUS; SUBCUTANEOUS at 13:40

## 2018-01-09 NOTE — ED PROVIDER NOTE - MEDICAL DECISION MAKING DETAILS
Pt well appearing and in no distress. Pt admits to being very anxious regarding her left hip. CE neg x 1. Pt without RFs. CT scan demonstrates no acute pathology. LE neg for DVT. PT living with sister and sister agree pt safe for dc. Pt is AOx3 and not actively psychotic or responding to stimuli. Pt is cooperative and coherent. Pt has appt with her psych in 1 week, and will write 1 week of pt's psych meds as she threw them away. Pt also has appt with her ortho in 3 days. Discussed results and outcome of testing with the patient.  Patient given copy of available results. Patient advised to please follow up with their PMD within the next 24 hours and return to the Emergency Department for worsening symptoms or any other concerns.

## 2018-01-09 NOTE — ED ADULT NURSE NOTE - OBJECTIVE STATEMENT
patient received, came here for bilateral hip pain on and off x2 months, also complaining of chest discomfort,  has been coughing and has a cold.  has been hearing voices, last on saturday, "incomplete words" has not been taking her psych meds since saturday,  has not slept for 3 nights. patient appears anxious. denies si/hi

## 2018-01-09 NOTE — ED PROVIDER NOTE - OBJECTIVE STATEMENT
51yo female with pmh bipolar, h/o Rt THR presents with rt sided cp, sob, and palpitations x 2 days. Pt also reports still with pain to bilateral hips and legs. PT states stopped taking all her meds 2 days ago because she is taking too many meds and has not been sleeping for 2 days. Ambien taken last night to help sleep. Pt denies SI/HI/VH/AH. No fevers, coughs.     ROS: No fever/chills. No photophobia/eye pain/changes in vision, No ear pain/sore throat/dysphagia, + chest pain, no palpitations. + SOB, no cough/stridor. No abdominal pain, N/V/D, no black/bloody bm. No dysuria/frequency/discharge, No headache. No Dizziness.  No rash.  No numbness/tingling/weakness.

## 2018-01-09 NOTE — ED ADULT TRIAGE NOTE - CHIEF COMPLAINT QUOTE
pt states, " I have bipolar, and had hip surgery in November, I started having hip and chest pain on Saturday, I threw out all my medicine " pt admits to hearing whispers on Saturday  ,denies suicidal and homicidal ideation.

## 2018-01-10 LAB
CULTURE RESULTS: SIGNIFICANT CHANGE UP
SPECIMEN SOURCE: SIGNIFICANT CHANGE UP

## 2018-01-12 ENCOUNTER — APPOINTMENT (OUTPATIENT)
Dept: ORTHOPEDIC SURGERY | Facility: CLINIC | Age: 53
End: 2018-01-12
Payer: MEDICAID

## 2018-01-12 VITALS
BODY MASS INDEX: 28.47 KG/M2 | DIASTOLIC BLOOD PRESSURE: 64 MMHG | WEIGHT: 145 LBS | HEART RATE: 93 BPM | HEIGHT: 60 IN | SYSTOLIC BLOOD PRESSURE: 117 MMHG

## 2018-01-12 DIAGNOSIS — Z96.641 PRESENCE OF RIGHT ARTIFICIAL HIP JOINT: ICD-10-CM

## 2018-01-12 DIAGNOSIS — M16.12 UNILATERAL PRIMARY OSTEOARTHRITIS, LEFT HIP: ICD-10-CM

## 2018-01-12 PROCEDURE — 73502 X-RAY EXAM HIP UNI 2-3 VIEWS: CPT | Mod: 79,LT

## 2018-01-12 PROCEDURE — 99214 OFFICE O/P EST MOD 30 MIN: CPT | Mod: 24

## 2018-01-12 RX ORDER — TRAMADOL HYDROCHLORIDE 50 MG/1
50 TABLET, COATED ORAL
Qty: 90 | Refills: 0 | Status: ACTIVE | COMMUNITY
Start: 2018-01-12 | End: 1900-01-01

## 2018-01-12 RX ORDER — GABAPENTIN 100 MG/1
100 CAPSULE ORAL 3 TIMES DAILY
Qty: 90 | Refills: 0 | Status: ACTIVE | COMMUNITY
Start: 2018-01-12 | End: 1900-01-01

## 2018-01-12 RX ORDER — MELOXICAM 15 MG/1
15 TABLET ORAL
Qty: 30 | Refills: 0 | Status: ACTIVE | COMMUNITY
Start: 2018-01-12 | End: 1900-01-01

## 2018-01-16 RX ORDER — MUPIROCIN 20 MG/G
2 OINTMENT TOPICAL
Qty: 32 | Refills: 0 | Status: ACTIVE | COMMUNITY
Start: 2018-01-16 | End: 1900-01-01

## 2018-01-18 ENCOUNTER — OUTPATIENT (OUTPATIENT)
Dept: OUTPATIENT SERVICES | Facility: HOSPITAL | Age: 53
LOS: 1 days | End: 2018-01-18

## 2018-01-18 VITALS
HEART RATE: 82 BPM | WEIGHT: 149.03 LBS | DIASTOLIC BLOOD PRESSURE: 66 MMHG | SYSTOLIC BLOOD PRESSURE: 110 MMHG | TEMPERATURE: 97 F | HEIGHT: 64 IN | RESPIRATION RATE: 14 BRPM

## 2018-01-18 DIAGNOSIS — M16.12 UNILATERAL PRIMARY OSTEOARTHRITIS, LEFT HIP: ICD-10-CM

## 2018-01-18 DIAGNOSIS — Z98.890 OTHER SPECIFIED POSTPROCEDURAL STATES: Chronic | ICD-10-CM

## 2018-01-18 DIAGNOSIS — Z90.49 ACQUIRED ABSENCE OF OTHER SPECIFIED PARTS OF DIGESTIVE TRACT: Chronic | ICD-10-CM

## 2018-01-18 DIAGNOSIS — Z96.641 PRESENCE OF RIGHT ARTIFICIAL HIP JOINT: Chronic | ICD-10-CM

## 2018-01-18 LAB
APPEARANCE UR: CLEAR — SIGNIFICANT CHANGE UP
APTT BLD: 33.3 SEC — SIGNIFICANT CHANGE UP (ref 27.5–37.4)
BILIRUB UR-MCNC: NEGATIVE — SIGNIFICANT CHANGE UP
BLD GP AB SCN SERPL QL: NEGATIVE — SIGNIFICANT CHANGE UP
BLOOD UR QL VISUAL: NEGATIVE — SIGNIFICANT CHANGE UP
BUN SERPL-MCNC: 16 MG/DL — SIGNIFICANT CHANGE UP (ref 7–23)
CALCIUM SERPL-MCNC: 9.5 MG/DL — SIGNIFICANT CHANGE UP (ref 8.4–10.5)
CHLORIDE SERPL-SCNC: 99 MMOL/L — SIGNIFICANT CHANGE UP (ref 98–107)
CO2 SERPL-SCNC: 26 MMOL/L — SIGNIFICANT CHANGE UP (ref 22–31)
COLOR SPEC: SIGNIFICANT CHANGE UP
CREAT SERPL-MCNC: 0.87 MG/DL — SIGNIFICANT CHANGE UP (ref 0.5–1.3)
GLUCOSE SERPL-MCNC: 71 MG/DL — SIGNIFICANT CHANGE UP (ref 70–99)
GLUCOSE UR-MCNC: NEGATIVE — SIGNIFICANT CHANGE UP
HBA1C BLD-MCNC: 5.1 % — SIGNIFICANT CHANGE UP (ref 4–5.6)
HCT VFR BLD CALC: 37 % — SIGNIFICANT CHANGE UP (ref 34.5–45)
HGB BLD-MCNC: 11.8 G/DL — SIGNIFICANT CHANGE UP (ref 11.5–15.5)
INR BLD: 0.96 — SIGNIFICANT CHANGE UP (ref 0.88–1.17)
KETONES UR-MCNC: NEGATIVE — SIGNIFICANT CHANGE UP
LEUKOCYTE ESTERASE UR-ACNC: NEGATIVE — SIGNIFICANT CHANGE UP
MCHC RBC-ENTMCNC: 29.2 PG — SIGNIFICANT CHANGE UP (ref 27–34)
MCHC RBC-ENTMCNC: 31.9 % — LOW (ref 32–36)
MCV RBC AUTO: 91.6 FL — SIGNIFICANT CHANGE UP (ref 80–100)
MUCOUS THREADS # UR AUTO: SIGNIFICANT CHANGE UP
NITRITE UR-MCNC: NEGATIVE — SIGNIFICANT CHANGE UP
NRBC # FLD: 0 — SIGNIFICANT CHANGE UP
PH UR: 7 — SIGNIFICANT CHANGE UP (ref 4.6–8)
PLATELET # BLD AUTO: 337 K/UL — SIGNIFICANT CHANGE UP (ref 150–400)
PMV BLD: 9.8 FL — SIGNIFICANT CHANGE UP (ref 7–13)
POTASSIUM SERPL-MCNC: 4.3 MMOL/L — SIGNIFICANT CHANGE UP (ref 3.5–5.3)
POTASSIUM SERPL-SCNC: 4.3 MMOL/L — SIGNIFICANT CHANGE UP (ref 3.5–5.3)
PROT UR-MCNC: NEGATIVE MG/DL — SIGNIFICANT CHANGE UP
PROTHROM AB SERPL-ACNC: 10.7 SEC — SIGNIFICANT CHANGE UP (ref 9.8–13.1)
RBC # BLD: 4.04 M/UL — SIGNIFICANT CHANGE UP (ref 3.8–5.2)
RBC # FLD: 13.9 % — SIGNIFICANT CHANGE UP (ref 10.3–14.5)
RBC CASTS # UR COMP ASSIST: SIGNIFICANT CHANGE UP (ref 0–?)
RH IG SCN BLD-IMP: POSITIVE — SIGNIFICANT CHANGE UP
SODIUM SERPL-SCNC: 139 MMOL/L — SIGNIFICANT CHANGE UP (ref 135–145)
SP GR SPEC: 1.01 — SIGNIFICANT CHANGE UP (ref 1–1.04)
SQUAMOUS # UR AUTO: SIGNIFICANT CHANGE UP
UROBILINOGEN FLD QL: NORMAL MG/DL — SIGNIFICANT CHANGE UP
WBC # BLD: 4.06 K/UL — SIGNIFICANT CHANGE UP (ref 3.8–10.5)
WBC # FLD AUTO: 4.06 K/UL — SIGNIFICANT CHANGE UP (ref 3.8–10.5)
WBC UR QL: SIGNIFICANT CHANGE UP (ref 0–?)

## 2018-01-18 RX ORDER — ACETAMINOPHEN 500 MG
975 TABLET ORAL ONCE
Qty: 0 | Refills: 0 | Status: COMPLETED | OUTPATIENT
Start: 2018-01-22 | End: 2018-01-22

## 2018-01-18 RX ORDER — SODIUM CHLORIDE 9 MG/ML
1000 INJECTION, SOLUTION INTRAVENOUS
Qty: 0 | Refills: 0 | Status: DISCONTINUED | OUTPATIENT
Start: 2018-01-22 | End: 2018-01-22

## 2018-01-18 RX ORDER — PANTOPRAZOLE SODIUM 20 MG/1
40 TABLET, DELAYED RELEASE ORAL ONCE
Qty: 0 | Refills: 0 | Status: COMPLETED | OUTPATIENT
Start: 2018-01-22 | End: 2018-01-22

## 2018-01-18 NOTE — H&P PST ADULT - PMH
Anemia    Anxiety    Appendicitis  1996  Bipolar depression    Other specified joint disorders, unspecified hip    Unilateral primary osteoarthritis, right hip

## 2018-01-18 NOTE — H&P PST ADULT - HIV STATUS
She called last week about getting a lift chair and a shower chair. [address] [employer] faxed over a form to request ICD codes. They faxed the form twice. Please call Indira Mayes.
Negative/Unknown

## 2018-01-18 NOTE — H&P PST ADULT - LYMPHATIC
anterior cervical L/supraclavicular L/anterior cervical R/posterior cervical L/posterior cervical R/supraclavicular R

## 2018-01-18 NOTE — H&P PST ADULT - HISTORY OF PRESENT ILLNESS
This is a 52 y.o. female s/p right hip replacement 11/2017 . Pt has left hip pain , evaluated by Dr Altamirano , left hip xrays 1/2017 . Pt has unilateral primary osteoarthritis , left hip now for surgery .

## 2018-01-18 NOTE — H&P PST ADULT - TEACHING/LEARNING LEARNING PREFERENCES
verbal instruction/video/written material/skill demonstration/group instruction/audio/pictorial/computer/internet/individual instruction

## 2018-01-18 NOTE — H&P PST ADULT - PSH
H/O arthroscopy  Right hip, labral repair 8/14/17  H/O colonoscopy    H/O dilation and curettage    History of appendectomy    History of right hip replacement  11/2017 - [facility]

## 2018-01-19 NOTE — ASU PATIENT PROFILE, ADULT - TEACHING/LEARNING LEARNING PREFERENCES
video/pictorial/written material/verbal instruction/skill demonstration/computer/internet/audio/group instruction/individual instruction

## 2018-01-21 ENCOUNTER — FORM ENCOUNTER (OUTPATIENT)
Age: 53
End: 2018-01-21

## 2018-01-21 LAB — BACTERIA NPH CULT: SIGNIFICANT CHANGE UP

## 2018-01-22 ENCOUNTER — INPATIENT (INPATIENT)
Facility: HOSPITAL | Age: 53
LOS: 0 days | Discharge: HOME HEALTH | End: 2018-01-23
Attending: ORTHOPAEDIC SURGERY | Admitting: ORTHOPAEDIC SURGERY
Payer: MEDICAID

## 2018-01-22 ENCOUNTER — RESULT REVIEW (OUTPATIENT)
Age: 53
End: 2018-01-22

## 2018-01-22 ENCOUNTER — APPOINTMENT (OUTPATIENT)
Dept: ORTHOPEDIC SURGERY | Facility: HOSPITAL | Age: 53
End: 2018-01-22

## 2018-01-22 VITALS
HEIGHT: 64 IN | SYSTOLIC BLOOD PRESSURE: 120 MMHG | OXYGEN SATURATION: 96 % | WEIGHT: 149.03 LBS | TEMPERATURE: 98 F | DIASTOLIC BLOOD PRESSURE: 80 MMHG | RESPIRATION RATE: 18 BRPM | HEART RATE: 73 BPM

## 2018-01-22 DIAGNOSIS — M16.12 UNILATERAL PRIMARY OSTEOARTHRITIS, LEFT HIP: ICD-10-CM

## 2018-01-22 DIAGNOSIS — Z96.641 PRESENCE OF RIGHT ARTIFICIAL HIP JOINT: Chronic | ICD-10-CM

## 2018-01-22 DIAGNOSIS — Z90.49 ACQUIRED ABSENCE OF OTHER SPECIFIED PARTS OF DIGESTIVE TRACT: Chronic | ICD-10-CM

## 2018-01-22 DIAGNOSIS — Z98.890 OTHER SPECIFIED POSTPROCEDURAL STATES: Chronic | ICD-10-CM

## 2018-01-22 LAB
BUN SERPL-MCNC: 11 MG/DL — SIGNIFICANT CHANGE UP (ref 7–23)
CALCIUM SERPL-MCNC: 8.7 MG/DL — SIGNIFICANT CHANGE UP (ref 8.4–10.5)
CHLORIDE SERPL-SCNC: 100 MMOL/L — SIGNIFICANT CHANGE UP (ref 98–107)
CO2 SERPL-SCNC: 27 MMOL/L — SIGNIFICANT CHANGE UP (ref 22–31)
CREAT SERPL-MCNC: 0.92 MG/DL — SIGNIFICANT CHANGE UP (ref 0.5–1.3)
GLUCOSE BLDC GLUCOMTR-MCNC: 88 MG/DL — SIGNIFICANT CHANGE UP (ref 70–99)
GLUCOSE SERPL-MCNC: 134 MG/DL — HIGH (ref 70–99)
HCG UR QL: NEGATIVE — SIGNIFICANT CHANGE UP
HCT VFR BLD CALC: 32.4 % — LOW (ref 34.5–45)
HGB BLD-MCNC: 10.7 G/DL — LOW (ref 11.5–15.5)
MCHC RBC-ENTMCNC: 29.4 PG — SIGNIFICANT CHANGE UP (ref 27–34)
MCHC RBC-ENTMCNC: 33 % — SIGNIFICANT CHANGE UP (ref 32–36)
MCV RBC AUTO: 89 FL — SIGNIFICANT CHANGE UP (ref 80–100)
NRBC # FLD: 0 — SIGNIFICANT CHANGE UP
PLATELET # BLD AUTO: 320 K/UL — SIGNIFICANT CHANGE UP (ref 150–400)
PMV BLD: 9.4 FL — SIGNIFICANT CHANGE UP (ref 7–13)
POTASSIUM SERPL-MCNC: 5 MMOL/L — SIGNIFICANT CHANGE UP (ref 3.5–5.3)
POTASSIUM SERPL-SCNC: 5 MMOL/L — SIGNIFICANT CHANGE UP (ref 3.5–5.3)
RBC # BLD: 3.64 M/UL — LOW (ref 3.8–5.2)
RBC # FLD: 13.2 % — SIGNIFICANT CHANGE UP (ref 10.3–14.5)
SODIUM SERPL-SCNC: 134 MMOL/L — LOW (ref 135–145)
WBC # BLD: 6.54 K/UL — SIGNIFICANT CHANGE UP (ref 3.8–10.5)
WBC # FLD AUTO: 6.54 K/UL — SIGNIFICANT CHANGE UP (ref 3.8–10.5)

## 2018-01-22 PROCEDURE — 88305 TISSUE EXAM BY PATHOLOGIST: CPT | Mod: 26

## 2018-01-22 PROCEDURE — 88311 DECALCIFY TISSUE: CPT | Mod: 26

## 2018-01-22 PROCEDURE — 73502 X-RAY EXAM HIP UNI 2-3 VIEWS: CPT | Mod: 26,LT

## 2018-01-22 PROCEDURE — 27130 TOTAL HIP ARTHROPLASTY: CPT | Mod: 79,LT

## 2018-01-22 RX ORDER — GABAPENTIN 400 MG/1
600 CAPSULE ORAL DAILY
Qty: 0 | Refills: 0 | Status: DISCONTINUED | OUTPATIENT
Start: 2018-01-22 | End: 2018-01-23

## 2018-01-22 RX ORDER — OXYCODONE HYDROCHLORIDE 5 MG/1
10 TABLET ORAL
Qty: 0 | Refills: 0 | Status: DISCONTINUED | OUTPATIENT
Start: 2018-01-22 | End: 2018-01-23

## 2018-01-22 RX ORDER — DOCUSATE SODIUM 100 MG
100 CAPSULE ORAL THREE TIMES A DAY
Qty: 0 | Refills: 0 | Status: DISCONTINUED | OUTPATIENT
Start: 2018-01-22 | End: 2018-01-22

## 2018-01-22 RX ORDER — VENLAFAXINE HCL 75 MG
150 CAPSULE, EXT RELEASE 24 HR ORAL DAILY
Qty: 0 | Refills: 0 | Status: DISCONTINUED | OUTPATIENT
Start: 2018-01-22 | End: 2018-01-23

## 2018-01-22 RX ORDER — QUETIAPINE FUMARATE 200 MG/1
600 TABLET, FILM COATED ORAL AT BEDTIME
Qty: 0 | Refills: 0 | Status: DISCONTINUED | OUTPATIENT
Start: 2018-01-22 | End: 2018-01-23

## 2018-01-22 RX ORDER — POLYETHYLENE GLYCOL 3350 17 G/17G
17 POWDER, FOR SOLUTION ORAL DAILY
Qty: 0 | Refills: 0 | Status: DISCONTINUED | OUTPATIENT
Start: 2018-01-22 | End: 2018-01-23

## 2018-01-22 RX ORDER — SENNA PLUS 8.6 MG/1
2 TABLET ORAL AT BEDTIME
Qty: 0 | Refills: 0 | Status: DISCONTINUED | OUTPATIENT
Start: 2018-01-22 | End: 2018-01-22

## 2018-01-22 RX ORDER — TRAMADOL HYDROCHLORIDE 50 MG/1
50 TABLET ORAL ONCE
Qty: 0 | Refills: 0 | Status: DISCONTINUED | OUTPATIENT
Start: 2018-01-22 | End: 2018-01-22

## 2018-01-22 RX ORDER — NALOXONE HYDROCHLORIDE 4 MG/.1ML
0.1 SPRAY NASAL
Qty: 0 | Refills: 0 | Status: DISCONTINUED | OUTPATIENT
Start: 2018-01-22 | End: 2018-01-23

## 2018-01-22 RX ORDER — OXYCODONE HYDROCHLORIDE 5 MG/1
5 TABLET ORAL
Qty: 0 | Refills: 0 | Status: DISCONTINUED | OUTPATIENT
Start: 2018-01-22 | End: 2018-01-23

## 2018-01-22 RX ORDER — GABAPENTIN 400 MG/1
300 CAPSULE ORAL ONCE
Qty: 0 | Refills: 0 | Status: COMPLETED | OUTPATIENT
Start: 2018-01-22 | End: 2018-01-22

## 2018-01-22 RX ORDER — KETOROLAC TROMETHAMINE 30 MG/ML
15 SYRINGE (ML) INJECTION EVERY 8 HOURS
Qty: 0 | Refills: 0 | Status: DISCONTINUED | OUTPATIENT
Start: 2018-01-22 | End: 2018-01-23

## 2018-01-22 RX ORDER — GABAPENTIN 400 MG/1
500 CAPSULE ORAL DAILY
Qty: 0 | Refills: 0 | Status: DISCONTINUED | OUTPATIENT
Start: 2018-01-22 | End: 2018-01-22

## 2018-01-22 RX ORDER — SENNA PLUS 8.6 MG/1
2 TABLET ORAL AT BEDTIME
Qty: 0 | Refills: 0 | Status: DISCONTINUED | OUTPATIENT
Start: 2018-01-22 | End: 2018-01-23

## 2018-01-22 RX ORDER — PANTOPRAZOLE SODIUM 20 MG/1
40 TABLET, DELAYED RELEASE ORAL DAILY
Qty: 0 | Refills: 0 | Status: DISCONTINUED | OUTPATIENT
Start: 2018-01-22 | End: 2018-01-23

## 2018-01-22 RX ORDER — OXYCODONE HYDROCHLORIDE 5 MG/1
5 TABLET ORAL EVERY 4 HOURS
Qty: 0 | Refills: 0 | Status: DISCONTINUED | OUTPATIENT
Start: 2018-01-22 | End: 2018-01-23

## 2018-01-22 RX ORDER — ACETAMINOPHEN 500 MG
650 TABLET ORAL EVERY 8 HOURS
Qty: 0 | Refills: 0 | Status: DISCONTINUED | OUTPATIENT
Start: 2018-01-22 | End: 2018-01-23

## 2018-01-22 RX ORDER — ONDANSETRON 8 MG/1
4 TABLET, FILM COATED ORAL EVERY 6 HOURS
Qty: 0 | Refills: 0 | Status: DISCONTINUED | OUTPATIENT
Start: 2018-01-22 | End: 2018-01-23

## 2018-01-22 RX ORDER — MORPHINE SULFATE 50 MG/1
2 CAPSULE, EXTENDED RELEASE ORAL
Qty: 0 | Refills: 0 | Status: DISCONTINUED | OUTPATIENT
Start: 2018-01-22 | End: 2018-01-23

## 2018-01-22 RX ORDER — CELECOXIB 200 MG/1
200 CAPSULE ORAL
Qty: 0 | Refills: 0 | Status: DISCONTINUED | OUTPATIENT
Start: 2018-01-22 | End: 2018-01-23

## 2018-01-22 RX ORDER — DEXAMETHASONE 0.5 MG/5ML
10 ELIXIR ORAL ONCE
Qty: 0 | Refills: 0 | Status: COMPLETED | OUTPATIENT
Start: 2018-01-23 | End: 2018-01-23

## 2018-01-22 RX ORDER — MORPHINE SULFATE 50 MG/1
4 CAPSULE, EXTENDED RELEASE ORAL ONCE
Qty: 0 | Refills: 0 | Status: DISCONTINUED | OUTPATIENT
Start: 2018-01-22 | End: 2018-01-23

## 2018-01-22 RX ORDER — FAMOTIDINE 10 MG/ML
20 INJECTION INTRAVENOUS
Qty: 0 | Refills: 0 | Status: DISCONTINUED | OUTPATIENT
Start: 2018-01-22 | End: 2018-01-23

## 2018-01-22 RX ORDER — ALPRAZOLAM 0.25 MG
2 TABLET ORAL
Qty: 0 | Refills: 0 | Status: DISCONTINUED | OUTPATIENT
Start: 2018-01-22 | End: 2018-01-23

## 2018-01-22 RX ORDER — ASPIRIN/CALCIUM CARB/MAGNESIUM 324 MG
325 TABLET ORAL
Qty: 0 | Refills: 0 | Status: DISCONTINUED | OUTPATIENT
Start: 2018-01-22 | End: 2018-01-23

## 2018-01-22 RX ORDER — HYDROMORPHONE HYDROCHLORIDE 2 MG/ML
1 INJECTION INTRAMUSCULAR; INTRAVENOUS; SUBCUTANEOUS
Qty: 0 | Refills: 0 | Status: DISCONTINUED | OUTPATIENT
Start: 2018-01-22 | End: 2018-01-23

## 2018-01-22 RX ORDER — ACETAMINOPHEN 500 MG
650 TABLET ORAL EVERY 6 HOURS
Qty: 0 | Refills: 0 | Status: DISCONTINUED | OUTPATIENT
Start: 2018-01-22 | End: 2018-01-23

## 2018-01-22 RX ORDER — SODIUM CHLORIDE 9 MG/ML
1000 INJECTION INTRAMUSCULAR; INTRAVENOUS; SUBCUTANEOUS ONCE
Qty: 0 | Refills: 0 | Status: COMPLETED | OUTPATIENT
Start: 2018-01-23 | End: 2018-01-23

## 2018-01-22 RX ORDER — CEFAZOLIN SODIUM 1 G
2000 VIAL (EA) INJECTION EVERY 8 HOURS
Qty: 0 | Refills: 0 | Status: COMPLETED | OUTPATIENT
Start: 2018-01-22 | End: 2018-01-23

## 2018-01-22 RX ORDER — MORPHINE SULFATE 50 MG/1
0.15 CAPSULE, EXTENDED RELEASE ORAL ONCE
Qty: 0 | Refills: 0 | Status: DISCONTINUED | OUTPATIENT
Start: 2018-01-22 | End: 2018-01-23

## 2018-01-22 RX ORDER — DOCUSATE SODIUM 100 MG
100 CAPSULE ORAL THREE TIMES A DAY
Qty: 0 | Refills: 0 | Status: DISCONTINUED | OUTPATIENT
Start: 2018-01-22 | End: 2018-01-23

## 2018-01-22 RX ORDER — TRAMADOL HYDROCHLORIDE 50 MG/1
50 TABLET ORAL EVERY 8 HOURS
Qty: 0 | Refills: 0 | Status: DISCONTINUED | OUTPATIENT
Start: 2018-01-22 | End: 2018-01-23

## 2018-01-22 RX ORDER — SODIUM CHLORIDE 9 MG/ML
1000 INJECTION INTRAMUSCULAR; INTRAVENOUS; SUBCUTANEOUS ONCE
Qty: 0 | Refills: 0 | Status: COMPLETED | OUTPATIENT
Start: 2018-01-22 | End: 2018-01-22

## 2018-01-22 RX ORDER — SODIUM CHLORIDE 9 MG/ML
1000 INJECTION, SOLUTION INTRAVENOUS
Qty: 0 | Refills: 0 | Status: DISCONTINUED | OUTPATIENT
Start: 2018-01-22 | End: 2018-01-23

## 2018-01-22 RX ORDER — SODIUM CHLORIDE 9 MG/ML
1000 INJECTION INTRAMUSCULAR; INTRAVENOUS; SUBCUTANEOUS ONCE
Qty: 0 | Refills: 0 | Status: DISCONTINUED | OUTPATIENT
Start: 2018-01-22 | End: 2018-01-22

## 2018-01-22 RX ADMIN — Medication 650 MILLIGRAM(S): at 21:06

## 2018-01-22 RX ADMIN — Medication 2 MILLIGRAM(S): at 17:50

## 2018-01-22 RX ADMIN — Medication 325 MILLIGRAM(S): at 17:50

## 2018-01-22 RX ADMIN — Medication 100 MILLIGRAM(S): at 21:06

## 2018-01-22 RX ADMIN — Medication 100 MILLIGRAM(S): at 19:51

## 2018-01-22 RX ADMIN — TRAMADOL HYDROCHLORIDE 50 MILLIGRAM(S): 50 TABLET ORAL at 08:46

## 2018-01-22 RX ADMIN — HYDROMORPHONE HYDROCHLORIDE 1 MILLIGRAM(S): 2 INJECTION INTRAMUSCULAR; INTRAVENOUS; SUBCUTANEOUS at 20:38

## 2018-01-22 RX ADMIN — Medication 975 MILLIGRAM(S): at 08:46

## 2018-01-22 RX ADMIN — Medication 15 MILLIGRAM(S): at 21:06

## 2018-01-22 RX ADMIN — PANTOPRAZOLE SODIUM 40 MILLIGRAM(S): 20 TABLET, DELAYED RELEASE ORAL at 08:46

## 2018-01-22 RX ADMIN — TRAMADOL HYDROCHLORIDE 50 MILLIGRAM(S): 50 TABLET ORAL at 21:06

## 2018-01-22 RX ADMIN — SODIUM CHLORIDE 30 MILLILITER(S): 9 INJECTION, SOLUTION INTRAVENOUS at 09:59

## 2018-01-22 RX ADMIN — SODIUM CHLORIDE 1000 MILLILITER(S): 9 INJECTION INTRAMUSCULAR; INTRAVENOUS; SUBCUTANEOUS at 17:31

## 2018-01-22 RX ADMIN — SODIUM CHLORIDE 150 MILLILITER(S): 9 INJECTION, SOLUTION INTRAVENOUS at 17:51

## 2018-01-22 RX ADMIN — FAMOTIDINE 20 MILLIGRAM(S): 10 INJECTION INTRAVENOUS at 17:50

## 2018-01-22 RX ADMIN — QUETIAPINE FUMARATE 600 MILLIGRAM(S): 200 TABLET, FILM COATED ORAL at 22:39

## 2018-01-22 RX ADMIN — GABAPENTIN 300 MILLIGRAM(S): 400 CAPSULE ORAL at 08:46

## 2018-01-22 RX ADMIN — SODIUM CHLORIDE 150 MILLILITER(S): 9 INJECTION, SOLUTION INTRAVENOUS at 15:17

## 2018-01-22 NOTE — BRIEF OPERATIVE NOTE - PROCEDURE
<<-----Click on this checkbox to enter Procedure Left total hip arthroplasty  01/22/2018    Active  [id number]

## 2018-01-22 NOTE — PROGRESS NOTE ADULT - SUBJECTIVE AND OBJECTIVE BOX
Ortho Post Op Note    Patient resting comfortably, pain controlled, no acute events.  Patient denies any chest pain, shortness of breath, nausea, vomiting, diarrhea, headache or dizziness.  Denies any numbness or tingling.  Patient currently has no complaints.    Vital Signs: Vital Signs Last 24 Hrs  T(C): 36.5 (22 Jan 2018 14:25), Max: 36.7 (22 Jan 2018 08:14)  T(F): 97.7 (22 Jan 2018 14:25), Max: 98 (22 Jan 2018 08:14)  HR: 74 (22 Jan 2018 16:00) (67 - 74)  BP: 114/64 (22 Jan 2018 16:00) (97/57 - 120/80)  BP(mean): --  RR: 14 (22 Jan 2018 16:00) (10 - 18)  SpO2: 100% (22 Jan 2018 16:00) (95% - 100%)    Gen: Alert, NAD  LLE: Dressing clean, dry and intact, no erythema.  Extremity warm, brisk capillary refill, compartments soft, +2 DP pulse, no calf tenderness.  5/5 EHL/FHL/GS/TA, SILT S/S/DP/SP/T    A/P: 52y Female s/p left total hip arthroplasty   1. Pain management  2. Ancef x24 hours  3. WBAT, anterior precautions  4. PT/OT  5. ASA BID for DVT ppx   6. Venodynes  7. Incentive spirometry  8. D/c planning tomorrow pending PT clearance

## 2018-01-22 NOTE — PATIENT PROFILE ADULT. - TEACHING/LEARNING LEARNING PREFERENCES
audio/pictorial/verbal instruction/video/group instruction/individual instruction/skill demonstration/computer/internet/written material

## 2018-01-23 ENCOUNTER — TRANSCRIPTION ENCOUNTER (OUTPATIENT)
Age: 53
End: 2018-01-23

## 2018-01-23 VITALS
TEMPERATURE: 98 F | SYSTOLIC BLOOD PRESSURE: 110 MMHG | RESPIRATION RATE: 18 BRPM | HEART RATE: 76 BPM | DIASTOLIC BLOOD PRESSURE: 58 MMHG | OXYGEN SATURATION: 98 %

## 2018-01-23 DIAGNOSIS — F31.30 BIPOLAR DISORDER, CURRENT EPISODE DEPRESSED, MILD OR MODERATE SEVERITY, UNSPECIFIED: ICD-10-CM

## 2018-01-23 DIAGNOSIS — D50.0 IRON DEFICIENCY ANEMIA SECONDARY TO BLOOD LOSS (CHRONIC): ICD-10-CM

## 2018-01-23 DIAGNOSIS — Z96.642 PRESENCE OF LEFT ARTIFICIAL HIP JOINT: ICD-10-CM

## 2018-01-23 LAB
BUN SERPL-MCNC: 8 MG/DL — SIGNIFICANT CHANGE UP (ref 7–23)
CALCIUM SERPL-MCNC: 8.1 MG/DL — LOW (ref 8.4–10.5)
CHLORIDE SERPL-SCNC: 104 MMOL/L — SIGNIFICANT CHANGE UP (ref 98–107)
CO2 SERPL-SCNC: 26 MMOL/L — SIGNIFICANT CHANGE UP (ref 22–31)
CREAT SERPL-MCNC: 0.84 MG/DL — SIGNIFICANT CHANGE UP (ref 0.5–1.3)
GLUCOSE SERPL-MCNC: 146 MG/DL — HIGH (ref 70–99)
HCT VFR BLD CALC: 27.1 % — LOW (ref 34.5–45)
HGB BLD-MCNC: 9.1 G/DL — LOW (ref 11.5–15.5)
MCHC RBC-ENTMCNC: 29.7 PG — SIGNIFICANT CHANGE UP (ref 27–34)
MCHC RBC-ENTMCNC: 33.6 % — SIGNIFICANT CHANGE UP (ref 32–36)
MCV RBC AUTO: 88.6 FL — SIGNIFICANT CHANGE UP (ref 80–100)
NRBC # FLD: 0 — SIGNIFICANT CHANGE UP
PLATELET # BLD AUTO: 256 K/UL — SIGNIFICANT CHANGE UP (ref 150–400)
PMV BLD: 9.7 FL — SIGNIFICANT CHANGE UP (ref 7–13)
POTASSIUM SERPL-MCNC: 4.1 MMOL/L — SIGNIFICANT CHANGE UP (ref 3.5–5.3)
POTASSIUM SERPL-SCNC: 4.1 MMOL/L — SIGNIFICANT CHANGE UP (ref 3.5–5.3)
RBC # BLD: 3.06 M/UL — LOW (ref 3.8–5.2)
RBC # FLD: 13.2 % — SIGNIFICANT CHANGE UP (ref 10.3–14.5)
SODIUM SERPL-SCNC: 140 MMOL/L — SIGNIFICANT CHANGE UP (ref 135–145)
WBC # BLD: 8.88 K/UL — SIGNIFICANT CHANGE UP (ref 3.8–10.5)
WBC # FLD AUTO: 8.88 K/UL — SIGNIFICANT CHANGE UP (ref 3.8–10.5)

## 2018-01-23 PROCEDURE — 99223 1ST HOSP IP/OBS HIGH 75: CPT

## 2018-01-23 RX ORDER — VENLAFAXINE HCL 75 MG
225 CAPSULE, EXT RELEASE 24 HR ORAL
Qty: 0 | Refills: 0 | Status: DISCONTINUED | OUTPATIENT
Start: 2018-01-23 | End: 2018-01-23

## 2018-01-23 RX ORDER — GABAPENTIN 400 MG/1
1 CAPSULE ORAL
Qty: 14 | Refills: 0 | OUTPATIENT
Start: 2018-01-23 | End: 2018-02-05

## 2018-01-23 RX ORDER — QUETIAPINE FUMARATE 200 MG/1
600 TABLET, FILM COATED ORAL AT BEDTIME
Qty: 0 | Refills: 0 | Status: DISCONTINUED | OUTPATIENT
Start: 2018-01-23 | End: 2018-01-23

## 2018-01-23 RX ORDER — SENNA PLUS 8.6 MG/1
2 TABLET ORAL
Qty: 10 | Refills: 0 | OUTPATIENT
Start: 2018-01-23 | End: 2018-01-27

## 2018-01-23 RX ORDER — GABAPENTIN 400 MG/1
500 CAPSULE ORAL
Qty: 0 | Refills: 0 | COMMUNITY

## 2018-01-23 RX ORDER — ALPRAZOLAM 0.25 MG
1 TABLET ORAL
Qty: 0 | Refills: 0 | COMMUNITY
Start: 2018-01-23

## 2018-01-23 RX ORDER — VENLAFAXINE HCL 75 MG
225 CAPSULE, EXT RELEASE 24 HR ORAL AT BEDTIME
Qty: 0 | Refills: 0 | Status: DISCONTINUED | OUTPATIENT
Start: 2018-01-23 | End: 2018-01-23

## 2018-01-23 RX ORDER — ALPRAZOLAM 0.25 MG
2 TABLET ORAL
Qty: 0 | Refills: 0 | Status: DISCONTINUED | OUTPATIENT
Start: 2018-01-23 | End: 2018-01-23

## 2018-01-23 RX ORDER — HYDROMORPHONE HYDROCHLORIDE 2 MG/ML
2 INJECTION INTRAMUSCULAR; INTRAVENOUS; SUBCUTANEOUS EVERY 4 HOURS
Qty: 0 | Refills: 0 | Status: DISCONTINUED | OUTPATIENT
Start: 2018-01-23 | End: 2018-01-23

## 2018-01-23 RX ORDER — HYDROMORPHONE HYDROCHLORIDE 2 MG/ML
1 INJECTION INTRAMUSCULAR; INTRAVENOUS; SUBCUTANEOUS ONCE
Qty: 0 | Refills: 0 | Status: DISCONTINUED | OUTPATIENT
Start: 2018-01-23 | End: 2018-01-23

## 2018-01-23 RX ORDER — ASPIRIN/CALCIUM CARB/MAGNESIUM 324 MG
1 TABLET ORAL
Qty: 84 | Refills: 0 | OUTPATIENT
Start: 2018-01-23 | End: 2018-03-05

## 2018-01-23 RX ORDER — HYDROMORPHONE HYDROCHLORIDE 2 MG/ML
2 INJECTION INTRAMUSCULAR; INTRAVENOUS; SUBCUTANEOUS
Qty: 60 | Refills: 0 | OUTPATIENT
Start: 2018-01-23 | End: 2018-01-29

## 2018-01-23 RX ORDER — TRAMADOL HYDROCHLORIDE 50 MG/1
1 TABLET ORAL
Qty: 42 | Refills: 0 | OUTPATIENT
Start: 2018-01-23 | End: 2018-02-05

## 2018-01-23 RX ORDER — MELOXICAM 15 MG/1
1 TABLET ORAL
Qty: 30 | Refills: 0 | OUTPATIENT
Start: 2018-01-23 | End: 2018-02-21

## 2018-01-23 RX ORDER — DOCUSATE SODIUM 100 MG
1 CAPSULE ORAL
Qty: 15 | Refills: 0 | OUTPATIENT
Start: 2018-01-23 | End: 2018-01-27

## 2018-01-23 RX ORDER — QUETIAPINE FUMARATE 200 MG/1
2 TABLET, FILM COATED ORAL
Qty: 0 | Refills: 0 | COMMUNITY
Start: 2018-01-23

## 2018-01-23 RX ORDER — OXYCODONE HYDROCHLORIDE 5 MG/1
10 TABLET ORAL EVERY 4 HOURS
Qty: 0 | Refills: 0 | Status: DISCONTINUED | OUTPATIENT
Start: 2018-01-23 | End: 2018-01-23

## 2018-01-23 RX ORDER — VENLAFAXINE HCL 75 MG
1 CAPSULE, EXT RELEASE 24 HR ORAL
Qty: 0 | Refills: 0 | COMMUNITY
Start: 2018-01-23

## 2018-01-23 RX ORDER — TRAMADOL HYDROCHLORIDE 50 MG/1
250 TABLET ORAL
Qty: 0 | Refills: 0 | COMMUNITY

## 2018-01-23 RX ORDER — FAMOTIDINE 10 MG/ML
1 INJECTION INTRAVENOUS
Qty: 60 | Refills: 0 | OUTPATIENT
Start: 2018-01-23 | End: 2018-02-21

## 2018-01-23 RX ORDER — HYDROMORPHONE HYDROCHLORIDE 2 MG/ML
4 INJECTION INTRAMUSCULAR; INTRAVENOUS; SUBCUTANEOUS EVERY 4 HOURS
Qty: 0 | Refills: 0 | Status: DISCONTINUED | OUTPATIENT
Start: 2018-01-23 | End: 2018-01-23

## 2018-01-23 RX ADMIN — HYDROMORPHONE HYDROCHLORIDE 1 MILLIGRAM(S): 2 INJECTION INTRAMUSCULAR; INTRAVENOUS; SUBCUTANEOUS at 09:30

## 2018-01-23 RX ADMIN — TRAMADOL HYDROCHLORIDE 50 MILLIGRAM(S): 50 TABLET ORAL at 14:54

## 2018-01-23 RX ADMIN — Medication 102 MILLIGRAM(S): at 04:15

## 2018-01-23 RX ADMIN — PANTOPRAZOLE SODIUM 40 MILLIGRAM(S): 20 TABLET, DELAYED RELEASE ORAL at 12:05

## 2018-01-23 RX ADMIN — Medication 650 MILLIGRAM(S): at 06:18

## 2018-01-23 RX ADMIN — Medication 325 MILLIGRAM(S): at 06:16

## 2018-01-23 RX ADMIN — Medication 100 MILLIGRAM(S): at 03:42

## 2018-01-23 RX ADMIN — POLYETHYLENE GLYCOL 3350 17 GRAM(S): 17 POWDER, FOR SOLUTION ORAL at 12:05

## 2018-01-23 RX ADMIN — Medication 15 MILLIGRAM(S): at 14:56

## 2018-01-23 RX ADMIN — Medication 225 MILLIGRAM(S): at 12:04

## 2018-01-23 RX ADMIN — Medication 100 MILLIGRAM(S): at 06:14

## 2018-01-23 RX ADMIN — Medication 650 MILLIGRAM(S): at 14:56

## 2018-01-23 RX ADMIN — HYDROMORPHONE HYDROCHLORIDE 1 MILLIGRAM(S): 2 INJECTION INTRAMUSCULAR; INTRAVENOUS; SUBCUTANEOUS at 06:18

## 2018-01-23 RX ADMIN — Medication 2 MILLIGRAM(S): at 12:02

## 2018-01-23 RX ADMIN — CELECOXIB 200 MILLIGRAM(S): 200 CAPSULE ORAL at 06:15

## 2018-01-23 RX ADMIN — GABAPENTIN 600 MILLIGRAM(S): 400 CAPSULE ORAL at 12:03

## 2018-01-23 RX ADMIN — TRAMADOL HYDROCHLORIDE 50 MILLIGRAM(S): 50 TABLET ORAL at 06:16

## 2018-01-23 RX ADMIN — HYDROMORPHONE HYDROCHLORIDE 1 MILLIGRAM(S): 2 INJECTION INTRAMUSCULAR; INTRAVENOUS; SUBCUTANEOUS at 09:14

## 2018-01-23 RX ADMIN — HYDROMORPHONE HYDROCHLORIDE 1 MILLIGRAM(S): 2 INJECTION INTRAMUSCULAR; INTRAVENOUS; SUBCUTANEOUS at 01:48

## 2018-01-23 RX ADMIN — SODIUM CHLORIDE 1000 MILLILITER(S): 9 INJECTION INTRAMUSCULAR; INTRAVENOUS; SUBCUTANEOUS at 06:44

## 2018-01-23 RX ADMIN — HYDROMORPHONE HYDROCHLORIDE 1 MILLIGRAM(S): 2 INJECTION INTRAMUSCULAR; INTRAVENOUS; SUBCUTANEOUS at 06:14

## 2018-01-23 RX ADMIN — CELECOXIB 200 MILLIGRAM(S): 200 CAPSULE ORAL at 06:17

## 2018-01-23 RX ADMIN — FAMOTIDINE 20 MILLIGRAM(S): 10 INJECTION INTRAVENOUS at 06:16

## 2018-01-23 RX ADMIN — Medication 15 MILLIGRAM(S): at 06:14

## 2018-01-23 RX ADMIN — HYDROMORPHONE HYDROCHLORIDE 1 MILLIGRAM(S): 2 INJECTION INTRAMUSCULAR; INTRAVENOUS; SUBCUTANEOUS at 01:49

## 2018-01-23 RX ADMIN — Medication 100 MILLIGRAM(S): at 14:55

## 2018-01-23 NOTE — DISCHARGE NOTE ADULT - HOME CARE AGENCY
[facility] - [phone]  Nurse to visit the day after hospital discharge; physical therapist to follow. Please contact the home care agency at the above phone number if you have not heard from them by approximately 12 noon on the day after your hospital discharge.

## 2018-01-23 NOTE — DISCHARGE NOTE ADULT - INSTRUCTIONS
may resume as prior to surgery Call MD for any complain of swelling, numbness, tingling to leg, or if pain not relieved after taking pain medications and fever.  You have a post op appointment with Dr. Altamirano on February 9, 2018 @8:45 AM at [facility], if unable to attend, please call office to reschedule.  Take over the counter stool softener to prevent constipation that can be caused by taking pain medication.  Leave aquacel dressing on till you see doctor at office.

## 2018-01-23 NOTE — PROGRESS NOTE ADULT - SUBJECTIVE AND OBJECTIVE BOX
Anesthesia Pain Management Service    SUBJECTIVE: Patient s/p spinal morphine with pain managable and no problems.  Pain Scale Score:  Refer to charted pain scores    THERAPY:    s/p spinal PF morphine yesterday.      MEDICATIONS  (STANDING):  acetaminophen   Tablet 650 milliGRAM(s) Oral every 8 hours  ALPRAZolam 2 milliGRAM(s) Oral two times a day  aspirin enteric coated 325 milliGRAM(s) Oral two times a day  celecoxib 200 milliGRAM(s) Oral with breakfast  docusate sodium 100 milliGRAM(s) Oral three times a day  gabapentin 600 milliGRAM(s) Oral daily  ketorolac   Injectable 15 milliGRAM(s) IV Push every 8 hours  lactated ringers. 1000 milliLiter(s) (150 mL/Hr) IV Continuous <Continuous>  morphine  - Injectable 4 milliGRAM(s) IV Push once  pantoprazole    Tablet 40 milliGRAM(s) Oral daily  polyethylene glycol 3350 17 Gram(s) Oral daily  QUEtiapine  milliGRAM(s) Oral at bedtime  traMADol 50 milliGRAM(s) Oral every 8 hours  venlafaxine XR. 150 milliGRAM(s) Oral daily    MEDICATIONS  (PRN):  acetaminophen   Tablet 650 milliGRAM(s) Oral every 6 hours PRN For Temp over 38.3 C (100.94 F)  aluminum hydroxide/magnesium hydroxide/simethicone Suspension 30 milliLiter(s) Oral four times a day PRN Indigestion  naloxone Injectable 0.1 milliGRAM(s) IV Push every 3 minutes PRN For ANY of the following changes in patient status:  A. RR LESS THAN 10 breaths per minute, B. Oxygen saturation LESS THAN 90%, C. Sedation score of 6  ondansetron Injectable 4 milliGRAM(s) IV Push every 6 hours PRN Nausea and/or Vomiting  oxyCODONE    IR 5 milliGRAM(s) Oral every 4 hours PRN mild to moderate pain  oxyCODONE    IR 10 milliGRAM(s) Oral every 4 hours PRN Severe Pain (7 - 10)  senna 2 Tablet(s) Oral at bedtime PRN Constipation      OBJECTIVE: pt. walking with PT    Sedation Score:	[ x] Alert	[ ] Drowsy	[ ] Arousable	[ ] Asleep	[ ] Unresponsive    Side Effects:	[ x] None	[ ] Nausea	[ ] Vomiting	[ ] Pruritus  		  [ ] Weakness		[ ] Numbness	[ ] Other:    Vital Signs Last 24 Hrs  T(C): 36.8 (23 Jan 2018 09:30), Max: 36.9 (23 Jan 2018 06:12)  T(F): 98.2 (23 Jan 2018 09:30), Max: 98.5 (23 Jan 2018 06:12)  HR: 82 (23 Jan 2018 09:30) (62 - 86)  BP: 109/60 (23 Jan 2018 09:30) (97/57 - 124/67)  BP(mean): --  RR: 18 (23 Jan 2018 09:30) (10 - 18)  SpO2: 98% (23 Jan 2018 09:30) (95% - 100%)    ASSESSMENT/ PLAN  [x ] Patient transitioned to prn analgesics  [x] Pain management per primary service, pain service to sign off   [x]Documentation and Verification of current medications     Comments: PRN opioids or adjuvant medication to be given.

## 2018-01-23 NOTE — DISCHARGE NOTE ADULT - MEDICATION SUMMARY - MEDICATIONS TO TAKE
I will START or STAY ON the medications listed below when I get home from the hospital:    Percocet 5/325 oral tablet  -- 1-2 tab(s) by mouth every 6 hours PRN Pain MDD:8  -- Indication: For Pain control    traMADol 50 mg oral tablet  -- 1 tab(s) by mouth every 8 hours MDD:3  -- Indication: For Pain control     Mobic 15 mg oral tablet  -- 1 tab(s) by mouth once a day MDD:1  -- Indication: For Pain control     aspirin 325 mg oral delayed release tablet  -- 1 tab(s) by mouth 2 times a day MDD:2  -- Indication: For Blood thinner     gabapentin 600 mg oral tablet  -- 1 tab(s) by mouth once a day MDD:1  -- Indication: For Pain control     venlafaxine 150 mg oral capsule, extended release  -- 1 cap(s) by mouth once a day  -- Indication: For depression     QUEtiapine 300 mg oral tablet  -- 2 tab(s) by mouth once a day (at bedtime)  -- Indication: For Home med     ALPRAZolam 2 mg oral tablet  -- 1 tab(s) by mouth 2 times a day  -- Indication: For Home med     Pepcid 20 mg oral tablet  -- 1 tab(s) by mouth 2 times a day MDD:2  -- It is very important that you take or use this exactly as directed.  Do not skip doses or discontinue unless directed by your doctor.  Obtain medical advice before taking any non-prescription drugs as some may affect the action of this medication.    -- Indication: For Home med     docusate sodium 100 mg oral capsule  -- 1 cap(s) by mouth 3 times a day MDD:3  -- Indication: For stool softener     senna oral tablet  -- 2 tab(s) by mouth once a day (at bedtime), As needed, Constipation MDD:2  -- Indication: For stool softener I will START or STAY ON the medications listed below when I get home from the hospital:    traMADol 50 mg oral tablet  -- 1 tab(s) by mouth every 8 hours MDD:3  -- Indication: For Pain control     Mobic 15 mg oral tablet  -- 1 tab(s) by mouth once a day MDD:1  -- Indication: For Pain control     aspirin 325 mg oral delayed release tablet  -- 1 tab(s) by mouth 2 times a day MDD:2  -- Indication: For Blood thinner     Dilaudid 2 mg oral tablet  -- 1-2 tab(s) by mouth every 4-6 hours prn moderate or severe pain MDD:10  -- Caution federal law prohibits the transfer of this drug to any person other  than the person for whom it was prescribed.  May cause drowsiness.  Alcohol may intensify this effect.  Use care when operating dangerous machinery.  This prescription cannot be refilled.  Using more of this medication than prescribed may cause serious breathing problems.    -- Indication: For Pain medicine    gabapentin 600 mg oral tablet  -- 1 tab(s) by mouth once a day MDD:1  -- Indication: For Pain control     venlafaxine 150 mg oral capsule, extended release  -- 1 cap(s) by mouth once a day  -- Indication: For depression     QUEtiapine 300 mg oral tablet  -- 2 tab(s) by mouth once a day (at bedtime)  -- Indication: For Home med     ALPRAZolam 2 mg oral tablet  -- 1 tab(s) by mouth 2 times a day  -- Indication: For Home med     Pepcid 20 mg oral tablet  -- 1 tab(s) by mouth 2 times a day MDD:2  -- It is very important that you take or use this exactly as directed.  Do not skip doses or discontinue unless directed by your doctor.  Obtain medical advice before taking any non-prescription drugs as some may affect the action of this medication.    -- Indication: For Home med     docusate sodium 100 mg oral capsule  -- 1 cap(s) by mouth 3 times a day MDD:3  -- Indication: For stool softener     senna oral tablet  -- 2 tab(s) by mouth once a day (at bedtime), As needed, Constipation MDD:2  -- Indication: For stool softener

## 2018-01-23 NOTE — DISCHARGE NOTE ADULT - CARE PLAN
Principal Discharge DX:	Unilateral primary osteoarthritis, left hip  Goal:	Pain reduction, improve ambulation and ADLs  Assessment and plan of treatment:	Please follow up with Dr Altamirano in 1-2 week. Call office to make an appointment. Please follow up with your PMD for continuity of care and management. Please keep aquacel dressing in place until post op day # 14. Any sutures/staples to be removed on post op day # 14.  Please take aspirin twice daily for DVT Prophylaxis. Weight bearing as tolerated. Call orthopaedics with any questions.

## 2018-01-23 NOTE — DISCHARGE NOTE ADULT - CARE PROVIDERS DIRECT ADDRESSES
[email or web address]

## 2018-01-23 NOTE — OCCUPATIONAL THERAPY INITIAL EVALUATION ADULT - PERTINENT HX OF CURRENT PROBLEM, REHAB EVAL
52 y.o. female s/p right hip replacement 11/2017 . Pt has left hip pain , evaluated by Dr Altamirano , left hip x-rays 1/2017 . Pt has unilateral primary osteoarthritis , left hip. Pt now s/p left ARA.

## 2018-01-23 NOTE — DISCHARGE NOTE ADULT - HOSPITAL COURSE
Patient is a 53 y/o female s/p left hip arthroplasty on 1/22/18. Patient tolerated the procedure well without any intraoperative complications. Patient states pain is controlled. Tolerated Physical Therapy well. Seen by medical attending during admission and cleared for safe discharge. As per Dr Altamirano patient is stable and ready for discharge.   Please follow up with Dr Altamirano in 1-2 week. Call office to make an appointment. Please follow up with your PMD for continuity of care and management. Please keep aquacel dressing in place until post op day # 14. Any sutures/staples to be removed on post op day # 14.  Please take aspirin twice daily for DVT Prophylaxis. Weight bearing as tolerated. Call orthopaedics with any questions.

## 2018-01-23 NOTE — DISCHARGE NOTE ADULT - MEDICATION SUMMARY - MEDICATIONS TO STOP TAKING
I will STOP taking the medications listed below when I get home from the hospital:    OxyCONTIN 20 mg oral tablet, extended release  -- 1 tab(s) by mouth every 12 hours MDD:2 pills  -- Caution federal law prohibits the transfer of this drug to any person other  than the person for whom it was prescribed.  Check with your doctor before becoming pregnant.  Do not chew, break, or crush.  Do not drink alcoholic beverages when taking this medication.  It is very important that you take or use this exactly as directed.  Do not skip doses or discontinue unless directed by your doctor.  May cause drowsiness.  Alcohol may intensify this effect.  Use care when operating dangerous machinery.  Obtain medical advice before taking any non-prescription drugs as some may affect the action of this medication.  Swallow whole.  Do not crush.  This drug may impair the ability to drive or operate machinery.  Use care until you become familiar with its effects.  This prescription cannot be refilled.  Using more of this medication than prescribed may cause serious breathing problems. I will STOP taking the medications listed below when I get home from the hospital:    Percocet 5/325 oral tablet  -- 1-2 tab(s) by mouth every 6 hours AS NEEDED for moderate to severe pain  begin tapering off as pain decreases  MDD:EIGHT TABS  -- Caution federal law prohibits the transfer of this drug to any person other  than the person for whom it was prescribed.  May cause drowsiness.  Alcohol may intensify this effect.  Use care when operating dangerous machinery.  This prescription cannot be refilled.  This product contains acetaminophen.  Do not use  with any other product containing acetaminophen to prevent possible liver damage.  Using more of this medication than prescribed may cause serious breathing problems.    OxyCONTIN 20 mg oral tablet, extended release  -- 1 tab(s) by mouth every 12 hours MDD:2 pills  -- Caution federal law prohibits the transfer of this drug to any person other  than the person for whom it was prescribed.  Check with your doctor before becoming pregnant.  Do not chew, break, or crush.  Do not drink alcoholic beverages when taking this medication.  It is very important that you take or use this exactly as directed.  Do not skip doses or discontinue unless directed by your doctor.  May cause drowsiness.  Alcohol may intensify this effect.  Use care when operating dangerous machinery.  Obtain medical advice before taking any non-prescription drugs as some may affect the action of this medication.  Swallow whole.  Do not crush.  This drug may impair the ability to drive or operate machinery.  Use care until you become familiar with its effects.  This prescription cannot be refilled.  Using more of this medication than prescribed may cause serious breathing problems.

## 2018-01-23 NOTE — DISCHARGE NOTE ADULT - CARE PROVIDER_API CALL
Tahir Altamirano), Orthopaedic Surgery  [address] [address]  Phone: [phone]  Fax: [phone]

## 2018-01-23 NOTE — DISCHARGE NOTE ADULT - PLAN OF CARE
Pain reduction, improve ambulation and ADLs Please follow up with Dr Altamirano in 1-2 week. Call office to make an appointment. Please follow up with your PMD for continuity of care and management. Please keep aquacel dressing in place until post op day # 14. Any sutures/staples to be removed on post op day # 14.  Please take aspirin twice daily for DVT Prophylaxis. Weight bearing as tolerated. Call orthopaedics with any questions.

## 2018-01-23 NOTE — CONSULT NOTE ADULT - PROBLEM SELECTOR RECOMMENDATION 2
Pain control with morphine IV PRN, celebrex, neurontin.  ASA for DVT prophylaxis.  Dressing care as per ortho.

## 2018-01-23 NOTE — PROGRESS NOTE ADULT - SUBJECTIVE AND OBJECTIVE BOX
Orthopaedic Surgery Progress Note    Subjective:   Patient seen and examined  No acute events overnight  Tolerating diet w/o nausea/vomiting  Pain well controlled with pain medications  Has been up and out of bed, worked with PT yesterday    Objective:  T(C): 36.8 (01-23-18 @ 00:50), Max: 36.8 (01-23-18 @ 00:50)  HR: 74 (01-23-18 @ 00:50) (62 - 74)  BP: 124/67 (01-23-18 @ 00:50) (97/57 - 124/67)  RR: 16 (01-23-18 @ 00:50) (10 - 18)  SpO2: 99% (01-23-18 @ 00:50) (95% - 100%)    01-22 @ 07:01  -  01-23 @ 06:10  --------------------------------------------------------  IN: 0 mL / OUT: 1950 mL / NET: -1950 mL    PE  NAD  LLE:   dressing C/D/I: aquacel  mild mirtha-incisional tenderness to palpation  motor intact GS/TA/EHL  SILT S/S/SP/DP  WWP distally                          10.7   6.54  )-----------( 320      ( 22 Jan 2018 14:35 )             32.4     01-22    134<L>  |  100  |  11  ----------------------------<  134<H>  5.0   |  27  |  0.92    Ca    8.7      22 Jan 2018 14:35    52y Female POD1 s/p ARA    - Pain control  - WBAT  - Anterior dislocation precautions  - PT/OT/OOB  - DVT ppx  - Dispo planning: PT recommending home with home PT, possibly ready for d/c today    Jose M Alas MD

## 2018-01-23 NOTE — DISCHARGE NOTE ADULT - NS AS DC FOLLOWUP STROKE INST
total hip replacement, discharge instructions, exercise worksheet, force, anterior hip precautions, aquacel dressing, sex position, gabapentin, aspirin, meloxicam, tramadol, percocet,/Smoking Cessation Smoking Cessation/total hip replacement, laxative, gabapentin, dilaudid, aspirin, meloxicam tramadol discharge instructions, exercise worksheet, force, anterior hip precautions, aquacel dressing, sex position, gabapentin, aspirin, meloxicam, tramadol, percocet,

## 2018-01-23 NOTE — PROGRESS NOTE ADULT - SUBJECTIVE AND OBJECTIVE BOX
Anesthesia Pain Management Service    SUBJECTIVE: Patient s/p spinal morphine with pain managable and no problems.  Pain Scale Score:  Refer to charted pain scores    THERAPY:    s/p spinal PF morphine yesterday.      MEDICATIONS  (STANDING):  acetaminophen   Tablet 650 milliGRAM(s) Oral every 8 hours  ALPRAZolam 2 milliGRAM(s) Oral <User Schedule>  aspirin enteric coated 325 milliGRAM(s) Oral two times a day  celecoxib 200 milliGRAM(s) Oral with breakfast  docusate sodium 100 milliGRAM(s) Oral three times a day  gabapentin 600 milliGRAM(s) Oral daily  ketorolac   Injectable 15 milliGRAM(s) IV Push every 8 hours  lactated ringers. 1000 milliLiter(s) (150 mL/Hr) IV Continuous <Continuous>  morphine  - Injectable 4 milliGRAM(s) IV Push once  pantoprazole    Tablet 40 milliGRAM(s) Oral daily  polyethylene glycol 3350 17 Gram(s) Oral daily  QUEtiapine  milliGRAM(s) Oral at bedtime  traMADol 50 milliGRAM(s) Oral every 8 hours  venlafaxine XR. 225 milliGRAM(s) Oral <User Schedule>    MEDICATIONS  (PRN):  acetaminophen   Tablet 650 milliGRAM(s) Oral every 6 hours PRN For Temp over 38.3 C (100.94 F)  aluminum hydroxide/magnesium hydroxide/simethicone Suspension 30 milliLiter(s) Oral four times a day PRN Indigestion  HYDROmorphone   Tablet 2 milliGRAM(s) Oral every 4 hours PRN mild or moderate pain  HYDROmorphone   Tablet 4 milliGRAM(s) Oral every 4 hours PRN Severe Pain (7 - 10)  naloxone Injectable 0.1 milliGRAM(s) IV Push every 3 minutes PRN For ANY of the following changes in patient status:  A. RR LESS THAN 10 breaths per minute, B. Oxygen saturation LESS THAN 90%, C. Sedation score of 6  ondansetron Injectable 4 milliGRAM(s) IV Push every 6 hours PRN Nausea and/or Vomiting  senna 2 Tablet(s) Oral at bedtime PRN Constipation      OBJECTIVE:    Sedation Score:	[ x] Alert	[ ] Drowsy	[ ] Arousable	[ ] Asleep	[ ] Unresponsive    Side Effects:	[ x] None	[ ] Nausea	[ ] Vomiting	[ ] Pruritus  		  [ ] Weakness		[ ] Numbness	[ ] Other:    Vital Signs Last 24 Hrs  T(C): 36.8 (23 Jan 2018 09:30), Max: 36.9 (23 Jan 2018 06:12)  T(F): 98.2 (23 Jan 2018 09:30), Max: 98.5 (23 Jan 2018 06:12)  HR: 82 (23 Jan 2018 09:30) (62 - 86)  BP: 109/60 (23 Jan 2018 09:30) (97/57 - 124/67)  BP(mean): --  RR: 18 (23 Jan 2018 09:30) (10 - 18)  SpO2: 98% (23 Jan 2018 09:30) (95% - 100%)    ASSESSMENT/ PLAN  [x ] Patient transitioned to prn analgesics  [x] Pain management per primary service, pain service to sign off   [x]Documentation and Verification of current medications     Comments: PRN opioids or adjuvant medication to be given.    Progress Note written now but Patient was seen earlier.

## 2018-01-23 NOTE — DISCHARGE NOTE ADULT - MEDICATION SUMMARY - MEDICATIONS TO CHANGE
I will SWITCH the dose or number of times a day I take the medications listed below when I get home from the hospital:    traMADol extended release  -- 250 milligram(s) by mouth once a day    gabapentin  -- 500 milligram(s) by mouth once a day

## 2018-01-23 NOTE — DISCHARGE NOTE ADULT - CONDITIONS AT DISCHARGE
Left hip aquacel dressing clean, dry and intact.  Toes warm and mobile.  +NVS, +pp +void, tolerated po and fluids

## 2018-01-23 NOTE — DISCHARGE NOTE ADULT - PATIENT PORTAL LINK FT
“You can access the [facility] Patient Portal, offered by [facility], by registering with the following website: [email or web address]”

## 2018-01-23 NOTE — PROGRESS NOTE ADULT - SUBJECTIVE AND OBJECTIVE BOX
ANESTHESIA POSTOP CHECK    52y Female POSTOP DAY 1 S/P     Vital Signs Last 24 Hrs  T(C): 36.8 (23 Jan 2018 09:30), Max: 36.9 (23 Jan 2018 06:12)  T(F): 98.2 (23 Jan 2018 09:30), Max: 98.5 (23 Jan 2018 06:12)  HR: 82 (23 Jan 2018 09:30) (62 - 86)  BP: 109/60 (23 Jan 2018 09:30) (97/57 - 124/67)  BP(mean): --  RR: 18 (23 Jan 2018 09:30) (10 - 18)  SpO2: 98% (23 Jan 2018 09:30) (95% - 100%)  I&O's Summary    22 Jan 2018 07:01  -  23 Jan 2018 07:00  --------------------------------------------------------  IN: 0 mL / OUT: 2450 mL / NET: -2450 mL    23 Jan 2018 07:01  -  23 Jan 2018 12:57  --------------------------------------------------------  IN: 0 mL / OUT: 600 mL / NET: -600 mL        [X ] NO APPARENT ANESTHESIA COMPLICATIONS      Comments: Moving lower extremities and no evidence of residual nerve block or problems.

## 2018-01-24 ENCOUNTER — TRANSCRIPTION ENCOUNTER (OUTPATIENT)
Age: 53
End: 2018-01-24

## 2018-01-26 LAB — SURGICAL PATHOLOGY STUDY: SIGNIFICANT CHANGE UP

## 2018-01-31 ENCOUNTER — MOBILE ON CALL (OUTPATIENT)
Age: 53
End: 2018-01-31

## 2018-01-31 ENCOUNTER — RX RENEWAL (OUTPATIENT)
Age: 53
End: 2018-01-31

## 2018-02-01 ENCOUNTER — RX RENEWAL (OUTPATIENT)
Age: 53
End: 2018-02-01

## 2018-02-01 RX ORDER — OXYCODONE AND ACETAMINOPHEN 10; 325 MG/1; MG/1
10-325 TABLET ORAL
Qty: 56 | Refills: 0 | Status: ACTIVE | COMMUNITY
Start: 2018-02-01 | End: 1900-01-01

## 2018-02-09 ENCOUNTER — APPOINTMENT (OUTPATIENT)
Dept: ORTHOPEDIC SURGERY | Facility: CLINIC | Age: 53
End: 2018-02-09
Payer: MEDICAID

## 2018-02-09 VITALS
BODY MASS INDEX: 28.47 KG/M2 | WEIGHT: 145 LBS | SYSTOLIC BLOOD PRESSURE: 108 MMHG | DIASTOLIC BLOOD PRESSURE: 71 MMHG | HEART RATE: 93 BPM | HEIGHT: 60 IN

## 2018-02-09 PROCEDURE — 73502 X-RAY EXAM HIP UNI 2-3 VIEWS: CPT | Mod: LT

## 2018-02-09 PROCEDURE — 99024 POSTOP FOLLOW-UP VISIT: CPT

## 2018-02-09 RX ORDER — OXYCODONE HYDROCHLORIDE 20 MG/1
20 TABLET, FILM COATED, EXTENDED RELEASE ORAL
Qty: 45 | Refills: 0 | Status: ACTIVE | COMMUNITY
Start: 2018-02-09 | End: 1900-01-01

## 2018-02-27 ENCOUNTER — RX RENEWAL (OUTPATIENT)
Age: 53
End: 2018-02-27

## 2018-02-27 RX ORDER — OXYCODONE HYDROCHLORIDE 20 MG/1
20 TABLET, FILM COATED, EXTENDED RELEASE ORAL
Qty: 40 | Refills: 0 | Status: ACTIVE | COMMUNITY
Start: 2018-02-27 | End: 1900-01-01

## 2018-03-09 RX ORDER — TRAMADOL HYDROCHLORIDE 50 MG/1
50 TABLET, COATED ORAL
Qty: 90 | Refills: 0 | Status: ACTIVE | COMMUNITY
Start: 2018-03-09 | End: 1900-01-01

## 2018-03-11 ENCOUNTER — EMERGENCY (EMERGENCY)
Facility: HOSPITAL | Age: 53
LOS: 0 days | Discharge: ANOTHER HOSPITAL | End: 2018-03-12
Attending: EMERGENCY MEDICINE
Payer: COMMERCIAL

## 2018-03-11 VITALS
OXYGEN SATURATION: 100 % | WEIGHT: 145.06 LBS | SYSTOLIC BLOOD PRESSURE: 127 MMHG | RESPIRATION RATE: 18 BRPM | HEART RATE: 81 BPM | TEMPERATURE: 98 F | HEIGHT: 66 IN | DIASTOLIC BLOOD PRESSURE: 84 MMHG

## 2018-03-11 DIAGNOSIS — Z96.641 PRESENCE OF RIGHT ARTIFICIAL HIP JOINT: Chronic | ICD-10-CM
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
## 2018-03-11 DIAGNOSIS — Z90.49 ACQUIRED ABSENCE OF OTHER SPECIFIED PARTS OF DIGESTIVE TRACT: Chronic | ICD-10-CM

## 2018-03-11 DIAGNOSIS — F41.9 ANXIETY DISORDER, UNSPECIFIED: ICD-10-CM

## 2018-03-11 DIAGNOSIS — Z98.890 OTHER SPECIFIED POSTPROCEDURAL STATES: Chronic | ICD-10-CM

## 2018-03-11 DIAGNOSIS — S73.005A UNSPECIFIED DISLOCATION OF LEFT HIP, INITIAL ENCOUNTER: ICD-10-CM

## 2018-03-11 DIAGNOSIS — Z96.643 PRESENCE OF ARTIFICIAL HIP JOINT, BILATERAL: ICD-10-CM
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
## 2018-03-11 DIAGNOSIS — D64.9 ANEMIA, UNSPECIFIED: ICD-10-CM

## 2018-03-11 DIAGNOSIS — M25.552 PAIN IN LEFT HIP: ICD-10-CM

## 2018-03-11 PROCEDURE — 99152 MOD SED SAME PHYS/QHP 5/>YRS: CPT

## 2018-03-11 PROCEDURE — 73502 X-RAY EXAM HIP UNI 2-3 VIEWS: CPT | Mod: 26,LT

## 2018-03-11 PROCEDURE — 99153 MOD SED SAME PHYS/QHP EA: CPT

## 2018-03-11 PROCEDURE — 99285 EMERGENCY DEPT VISIT HI MDM: CPT | Mod: 25

## 2018-03-11 RX ORDER — HYDROMORPHONE HYDROCHLORIDE 2 MG/ML
1 INJECTION INTRAMUSCULAR; INTRAVENOUS; SUBCUTANEOUS ONCE
Qty: 0 | Refills: 0 | Status: DISCONTINUED | OUTPATIENT
Start: 2018-03-11 | End: 2018-03-11

## 2018-03-11 RX ORDER — SODIUM CHLORIDE 9 MG/ML
1000 INJECTION INTRAMUSCULAR; INTRAVENOUS; SUBCUTANEOUS ONCE
Qty: 0 | Refills: 0 | Status: COMPLETED | OUTPATIENT
Start: 2018-03-11 | End: 2018-03-11

## 2018-03-11 RX ADMIN — HYDROMORPHONE HYDROCHLORIDE 1 MILLIGRAM(S): 2 INJECTION INTRAMUSCULAR; INTRAVENOUS; SUBCUTANEOUS at 22:33

## 2018-03-11 RX ADMIN — HYDROMORPHONE HYDROCHLORIDE 1 MILLIGRAM(S): 2 INJECTION INTRAMUSCULAR; INTRAVENOUS; SUBCUTANEOUS at 23:08

## 2018-03-11 RX ADMIN — SODIUM CHLORIDE 1000 MILLILITER(S): 9 INJECTION INTRAMUSCULAR; INTRAVENOUS; SUBCUTANEOUS at 22:31

## 2018-03-11 RX ADMIN — HYDROMORPHONE HYDROCHLORIDE 1 MILLIGRAM(S): 2 INJECTION INTRAMUSCULAR; INTRAVENOUS; SUBCUTANEOUS at 23:31

## 2018-03-11 NOTE — ED PROVIDER NOTE - PHYSICAL EXAMINATION
Gen: Alert, severe distress  Head: NC, AT, EOMI, normal lids/conjunctiva  ENT: normal hearing  Neck: +supple, no tenderness, +Trachea midline  Pulm: Bilateral BS, normal resp effort, no wheeze/stridor/retractions  CV: RRR, no M/R/G, +dist pulses  Abd: soft, NT/ND  Mskel: no edema/erythema/cyanosis, LLE without visible deformity or shortening, mild external rotation, unable to tolerate any hip movement, severe tenderness over left hip  Skin: no rash, warm/dry  Neuro: AAOx3, no sensory/motor deficits, CN 2-12 intact

## 2018-03-11 NOTE — ED PROVIDER NOTE - OBJECTIVE STATEMENT
Pertinent PMH/PSH/FHx/SHx and Review of Systems contained within:  Patient presents to the ED for left hip pain.  Patient is s/p BL hip replacement with Dr. Lyles at [facility], right hip replaced 8 weeks ago, left hip replaced just 4 weeks ago.  Stood up from chair and felt sudden pop with extreme pain, now unable to bear any weight on left extremity.  Denies head injury when she fell. Patient was given morphine 4mg by EMS.    Relevant PMHx/SHx/SOCHx/FAMH:  Osteoarthritis, BL hip replacement  Patient denies EtOH/tobacco/illicit substance use.    ROS: No fever/chills, No headache/photophobia/eye pain/changes in vision, No ear pain/sore throat/dysphagia, No chest pain/palpitations, no SOB/cough/wheeze/stridor, No abdominal pain, No N/V/D/melena, no dysuria/frequency/discharge, No neck/back pain, no rash, no changes in neurological status/function.

## 2018-03-11 NOTE — ED PROVIDER NOTE - MEDICAL DECISION MAKING DETAILS
Patient with left hip Patient with left hip dislocation.  VSS, NV intact.  Failed hip reduction in ER under conscious sedation. Dr. Reid was contacted via transfer center and willing to accept patient to his service.  Patient consents to transfer.

## 2018-03-11 NOTE — ED ADULT TRIAGE NOTE - CHIEF COMPLAINT QUOTE
Pt complains of left hip pain 10/10 shooting, stabbing. States she had a total hip replacement x 6 weeks ago and thinks it's dislocated. 5mg of Morphine given IVP

## 2018-03-12 ENCOUNTER — EMERGENCY (EMERGENCY)
Facility: HOSPITAL | Age: 53
LOS: 1 days | Discharge: HOME | End: 2018-03-12
Attending: EMERGENCY MEDICINE | Admitting: EMERGENCY MEDICINE
Payer: MEDICAID

## 2018-03-12 VITALS
DIASTOLIC BLOOD PRESSURE: 72 MMHG | TEMPERATURE: 98 F | HEART RATE: 98 BPM | SYSTOLIC BLOOD PRESSURE: 127 MMHG | OXYGEN SATURATION: 100 % | RESPIRATION RATE: 16 BRPM

## 2018-03-12 VITALS
SYSTOLIC BLOOD PRESSURE: 110 MMHG | TEMPERATURE: 98 F | HEART RATE: 83 BPM | DIASTOLIC BLOOD PRESSURE: 63 MMHG | OXYGEN SATURATION: 97 % | RESPIRATION RATE: 16 BRPM

## 2018-03-12 DIAGNOSIS — Z98.890 OTHER SPECIFIED POSTPROCEDURAL STATES: Chronic | ICD-10-CM

## 2018-03-12 DIAGNOSIS — Z96.641 PRESENCE OF RIGHT ARTIFICIAL HIP JOINT: Chronic | ICD-10-CM

## 2018-03-12 DIAGNOSIS — Z90.49 ACQUIRED ABSENCE OF OTHER SPECIFIED PARTS OF DIGESTIVE TRACT: Chronic | ICD-10-CM

## 2018-03-12 LAB
ALBUMIN SERPL ELPH-MCNC: 3.3 G/DL — SIGNIFICANT CHANGE UP (ref 3.3–5)
ALP SERPL-CCNC: 95 U/L — SIGNIFICANT CHANGE UP (ref 40–120)
ALT FLD-CCNC: 20 U/L — SIGNIFICANT CHANGE UP (ref 12–78)
ANION GAP SERPL CALC-SCNC: 6 MMOL/L — SIGNIFICANT CHANGE UP (ref 5–17)
APTT BLD: 31.6 SEC — SIGNIFICANT CHANGE UP (ref 27.5–37.4)
AST SERPL-CCNC: 25 U/L — SIGNIFICANT CHANGE UP (ref 15–37)
BASOPHILS # BLD AUTO: 0.05 K/UL — SIGNIFICANT CHANGE UP (ref 0–0.2)
BASOPHILS NFR BLD AUTO: 0.7 % — SIGNIFICANT CHANGE UP (ref 0–2)
BILIRUB SERPL-MCNC: 0.3 MG/DL — SIGNIFICANT CHANGE UP (ref 0.2–1.2)
BLD GP AB SCN SERPL QL: SIGNIFICANT CHANGE UP
BUN SERPL-MCNC: 7 MG/DL — SIGNIFICANT CHANGE UP (ref 7–23)
CALCIUM SERPL-MCNC: 8.4 MG/DL — LOW (ref 8.5–10.1)
CHLORIDE SERPL-SCNC: 104 MMOL/L — SIGNIFICANT CHANGE UP (ref 96–108)
CO2 SERPL-SCNC: 28 MMOL/L — SIGNIFICANT CHANGE UP (ref 22–31)
CREAT SERPL-MCNC: 0.8 MG/DL — SIGNIFICANT CHANGE UP (ref 0.5–1.3)
EOSINOPHIL # BLD AUTO: 0.29 K/UL — SIGNIFICANT CHANGE UP (ref 0–0.5)
EOSINOPHIL NFR BLD AUTO: 4.1 % — SIGNIFICANT CHANGE UP (ref 0–6)
GLUCOSE SERPL-MCNC: 92 MG/DL — SIGNIFICANT CHANGE UP (ref 70–99)
HCG SERPL-ACNC: 4 MIU/ML — SIGNIFICANT CHANGE UP
HCT VFR BLD CALC: 34.1 % — LOW (ref 34.5–45)
HGB BLD-MCNC: 11.1 G/DL — LOW (ref 11.5–15.5)
IMM GRANULOCYTES NFR BLD AUTO: 0.3 % — SIGNIFICANT CHANGE UP (ref 0–1.5)
INR BLD: 1.03 RATIO — SIGNIFICANT CHANGE UP (ref 0.88–1.16)
LYMPHOCYTES # BLD AUTO: 3.04 K/UL — SIGNIFICANT CHANGE UP (ref 1–3.3)
LYMPHOCYTES # BLD AUTO: 42.9 % — SIGNIFICANT CHANGE UP (ref 13–44)
MCHC RBC-ENTMCNC: 28 PG — SIGNIFICANT CHANGE UP (ref 27–34)
MCHC RBC-ENTMCNC: 32.6 GM/DL — SIGNIFICANT CHANGE UP (ref 32–36)
MCV RBC AUTO: 85.9 FL — SIGNIFICANT CHANGE UP (ref 80–100)
MONOCYTES # BLD AUTO: 0.47 K/UL — SIGNIFICANT CHANGE UP (ref 0–0.9)
MONOCYTES NFR BLD AUTO: 6.6 % — SIGNIFICANT CHANGE UP (ref 2–14)
NEUTROPHILS # BLD AUTO: 3.21 K/UL — SIGNIFICANT CHANGE UP (ref 1.8–7.4)
NEUTROPHILS NFR BLD AUTO: 45.4 % — SIGNIFICANT CHANGE UP (ref 43–77)
NRBC # BLD: 0 /100 WBCS — SIGNIFICANT CHANGE UP (ref 0–0)
PLATELET # BLD AUTO: 373 K/UL — SIGNIFICANT CHANGE UP (ref 150–400)
POTASSIUM SERPL-MCNC: 4 MMOL/L — SIGNIFICANT CHANGE UP (ref 3.5–5.3)
POTASSIUM SERPL-SCNC: 4 MMOL/L — SIGNIFICANT CHANGE UP (ref 3.5–5.3)
PROT SERPL-MCNC: 7 GM/DL — SIGNIFICANT CHANGE UP (ref 6–8.3)
PROTHROM AB SERPL-ACNC: 11.2 SEC — SIGNIFICANT CHANGE UP (ref 9.8–12.7)
RBC # BLD: 3.97 M/UL — SIGNIFICANT CHANGE UP (ref 3.8–5.2)
RBC # FLD: 13.9 % — SIGNIFICANT CHANGE UP (ref 10.3–14.5)
SODIUM SERPL-SCNC: 138 MMOL/L — SIGNIFICANT CHANGE UP (ref 135–145)
WBC # BLD: 7.08 K/UL — SIGNIFICANT CHANGE UP (ref 3.8–10.5)
WBC # FLD AUTO: 7.08 K/UL — SIGNIFICANT CHANGE UP (ref 3.8–10.5)

## 2018-03-12 PROCEDURE — 73552 X-RAY EXAM OF FEMUR 2/>: CPT | Mod: 26,77,LT

## 2018-03-12 PROCEDURE — 72170 X-RAY EXAM OF PELVIS: CPT | Mod: 26

## 2018-03-12 PROCEDURE — 99156 MOD SED OTH PHYS/QHP 5/>YRS: CPT

## 2018-03-12 PROCEDURE — 99285 EMERGENCY DEPT VISIT HI MDM: CPT | Mod: 25

## 2018-03-12 PROCEDURE — 71045 X-RAY EXAM CHEST 1 VIEW: CPT | Mod: 26

## 2018-03-12 PROCEDURE — 72170 X-RAY EXAM OF PELVIS: CPT | Mod: 26,77

## 2018-03-12 PROCEDURE — 73552 X-RAY EXAM OF FEMUR 2/>: CPT | Mod: 26,LT

## 2018-03-12 RX ORDER — SODIUM CHLORIDE 9 MG/ML
1000 INJECTION INTRAMUSCULAR; INTRAVENOUS; SUBCUTANEOUS ONCE
Qty: 0 | Refills: 0 | Status: COMPLETED | OUTPATIENT
Start: 2018-03-12 | End: 2018-03-12

## 2018-03-12 RX ORDER — ACETAMINOPHEN 500 MG
650 TABLET ORAL ONCE
Qty: 0 | Refills: 0 | Status: COMPLETED | OUTPATIENT
Start: 2018-03-12 | End: 2018-03-12

## 2018-03-12 RX ORDER — HYDROMORPHONE HYDROCHLORIDE 2 MG/ML
0.5 INJECTION INTRAMUSCULAR; INTRAVENOUS; SUBCUTANEOUS ONCE
Qty: 0 | Refills: 0 | Status: DISCONTINUED | OUTPATIENT
Start: 2018-03-12 | End: 2018-03-12

## 2018-03-12 RX ORDER — HYDROMORPHONE HYDROCHLORIDE 2 MG/ML
2 INJECTION INTRAMUSCULAR; INTRAVENOUS; SUBCUTANEOUS ONCE
Qty: 0 | Refills: 0 | Status: DISCONTINUED | OUTPATIENT
Start: 2018-03-12 | End: 2018-03-12

## 2018-03-12 RX ORDER — OXYCODONE HYDROCHLORIDE 5 MG/1
1 TABLET ORAL
Qty: 12 | Refills: 0 | OUTPATIENT
Start: 2018-03-12 | End: 2018-03-14

## 2018-03-12 RX ORDER — ONDANSETRON 8 MG/1
4 TABLET, FILM COATED ORAL ONCE
Qty: 0 | Refills: 0 | Status: COMPLETED | OUTPATIENT
Start: 2018-03-12 | End: 2018-03-12

## 2018-03-12 RX ORDER — PROPOFOL 10 MG/ML
150 INJECTION, EMULSION INTRAVENOUS ONCE
Qty: 0 | Refills: 0 | Status: DISCONTINUED | OUTPATIENT
Start: 2018-03-12 | End: 2018-03-12

## 2018-03-12 RX ORDER — ONDANSETRON 8 MG/1
1 TABLET, FILM COATED ORAL
Qty: 12 | Refills: 0 | OUTPATIENT
Start: 2018-03-12 | End: 2018-03-14

## 2018-03-12 RX ORDER — FENTANYL CITRATE 50 UG/ML
50 INJECTION INTRAVENOUS ONCE
Qty: 0 | Refills: 0 | Status: DISCONTINUED | OUTPATIENT
Start: 2018-03-12 | End: 2018-03-12

## 2018-03-12 RX ORDER — PROPOFOL 10 MG/ML
160 INJECTION, EMULSION INTRAVENOUS ONCE
Qty: 0 | Refills: 0 | Status: COMPLETED | OUTPATIENT
Start: 2018-03-12 | End: 2018-03-12

## 2018-03-12 RX ORDER — ONDANSETRON 8 MG/1
4 TABLET, FILM COATED ORAL ONCE
Qty: 0 | Refills: 0 | Status: DISCONTINUED | OUTPATIENT
Start: 2018-03-12 | End: 2018-03-12

## 2018-03-12 RX ADMIN — Medication 650 MILLIGRAM(S): at 09:29

## 2018-03-12 RX ADMIN — PROPOFOL 160 MILLIGRAM(S): 10 INJECTION, EMULSION INTRAVENOUS at 05:26

## 2018-03-12 RX ADMIN — ONDANSETRON 4 MILLIGRAM(S): 8 TABLET, FILM COATED ORAL at 10:49

## 2018-03-12 RX ADMIN — HYDROMORPHONE HYDROCHLORIDE 1 MILLIGRAM(S): 2 INJECTION INTRAMUSCULAR; INTRAVENOUS; SUBCUTANEOUS at 00:03

## 2018-03-12 RX ADMIN — HYDROMORPHONE HYDROCHLORIDE 0.5 MILLIGRAM(S): 2 INJECTION INTRAMUSCULAR; INTRAVENOUS; SUBCUTANEOUS at 13:19

## 2018-03-12 RX ADMIN — HYDROMORPHONE HYDROCHLORIDE 0.5 MILLIGRAM(S): 2 INJECTION INTRAMUSCULAR; INTRAVENOUS; SUBCUTANEOUS at 10:35

## 2018-03-12 RX ADMIN — ONDANSETRON 4 MILLIGRAM(S): 8 TABLET, FILM COATED ORAL at 13:19

## 2018-03-12 RX ADMIN — SODIUM CHLORIDE 1000 MILLILITER(S): 9 INJECTION INTRAMUSCULAR; INTRAVENOUS; SUBCUTANEOUS at 03:16

## 2018-03-12 RX ADMIN — Medication 650 MILLIGRAM(S): at 10:35

## 2018-03-12 RX ADMIN — HYDROMORPHONE HYDROCHLORIDE 2 MILLIGRAM(S): 2 INJECTION INTRAMUSCULAR; INTRAVENOUS; SUBCUTANEOUS at 03:16

## 2018-03-12 RX ADMIN — HYDROMORPHONE HYDROCHLORIDE 0.5 MILLIGRAM(S): 2 INJECTION INTRAMUSCULAR; INTRAVENOUS; SUBCUTANEOUS at 12:20

## 2018-03-12 RX ADMIN — FENTANYL CITRATE 50 MICROGRAM(S): 50 INJECTION INTRAVENOUS at 04:03

## 2018-03-12 NOTE — CONSULT NOTE ADULT - ASSESSMENT
53F s/p closed reduction of dislocated L ARA  - WBAT  - Strict anterior hip precautions  - Knee immobilizer  - Pain control  - FU with Dr. Altamirano 1-2 weeks

## 2018-03-12 NOTE — ED PROVIDER NOTE - ATTENDING CONTRIBUTION TO CARE
I performed a face-to-face evaluation of the patient and performed a history and physical examination. I agree with the history and physical examination.    Chris: L hip dislocation. Conscious sedation. Ortho.

## 2018-03-12 NOTE — CHART NOTE - NSCHARTNOTEFT_GEN_A_CORE
Discussed with attending Dr. Altamirano    Patient will need left hip abduction brace prior to DC from ED.  Orthotics contacted for hip abduction brace fitting.

## 2018-03-12 NOTE — ED ADULT NURSE NOTE - CAS EDP DISCH TYPE
Home History of hysterectomy    Hx of CABG  3v 2004  S/P angioplasty with stent  2009, 3/2014  S/P below knee amputation, right  6/2010  S/P BKA (below knee amputation) unilateral, right    S/P CABG x 3    S/P eye surgery  for glaucoma  S/P hysterectomy  2004

## 2018-03-12 NOTE — ED PROVIDER NOTE - PROGRESS NOTE DETAILS
Mirta Hopper M.D: pt awake, alert, protecting airway s/p procedural sedation. pt still very sleepy. will monitor given large dose of propofol administered for sedation. Melina: patient signed out to me by Dr Hopper, patient transferred for hip dislocation s/p ortho reduction and procedural sedation. Plan: repeat xr, ortho for brace and observation s/p sedation Morad: patient more awake and alert, awaiting ortho brace, paged ortho who will be in touch with orthotist for brace Melina; spoke with ortho, orthotist will be here in 2-3 hours. will likely send to [facility] until ortho comes for brace

## 2018-03-12 NOTE — ED ADULT NURSE NOTE - CHIEF COMPLAINT QUOTE
Transferred from [facility] for left hip fx.  Appears comfortable and in no apparent distress.  Patient had bilateral hip replacement surgery 6 wks ago.  She was standing and felt her legs give way.  Denies LOC, head trauma or fall.  No past medical history.  20g IV left AC in place on arrival.  Given Dilaudid 2mg and Fentanyl 50mg at [place].

## 2018-03-12 NOTE — CONSULT NOTE ADULT - SUBJECTIVE AND OBJECTIVE BOX
CC: L hip pain  HPI:   53yFemale s/p bilateral ARA (Adarsh R ARA 11/2017, L ARA 1/2018), transferred from [facility] for L ARA dislocation. Reportedly, she was getting up from a chair when she felt a pop and her left hip gave way. She denies any falls or any other trauma. First time dislocation. Attempted closed reduction at [facility] was unsuccessful. She was subsequently transferred here for further management.     Review of systems: As per HPI, otherwise negative.     PAST MEDICAL & SURGICAL HISTORY:  Anxiety  Anemia  Unilateral primary osteoarthritis, right hip  Other specified joint disorders, unspecified hip  Bipolar depression  Depression  Appendicitis: 1996  History of right hip replacement: 11/2017 - [facility]  H/O arthroscopy: Right hip, labral repair 8/14/17  History of appendectomy  H/O dilation and curettage  H/O colonoscopy  S/P appendectomy    FAMILY HISTORY:  Family history of schizophrenia (Sibling)  Family history of depression (Mother)      MEDICATIONS  (STANDING):  propofol Injectable 160 milliGRAM(s) IV Push once    MEDICATIONS  (PRN):      T(C): 36.7 (03-12-18 @ 04:36), Max: 36.9 (03-11-18 @ 21:28)  HR: 72 (03-12-18 @ 06:00) (72 - 83)  BP: 118/69 (03-12-18 @ 06:00) (106/69 - 128/93)  RR: 12 (03-12-18 @ 06:00) (12 - 19)  SpO2: 100% (03-12-18 @ 06:00) (97% - 100%)  Wt(kg): --                        11.1   7.08  )-----------( 373      ( 12 Mar 2018 00:47 )             34.1     03-12    138  |  104  |  7   ----------------------------<  92  4.0   |  28  |  0.80    Ca    8.4<L>      12 Mar 2018 00:47    TPro  7.0  /  Alb  3.3  /  TBili  0.3  /  DBili  x   /  AST  25  /  ALT  20  /  AlkPhos  95  03-12      EXAM:  Awake, Alert and in no acute distress  LLE hip TTP  Palpable deformity  Thigh compartments soft and compressible  LLE shortened and externally rotated, short by approx 2 cm  EHL/FHL/TA/GS intact  SILT SP/DP/T/S/S  WWP brisk cap refill     Imaging  XR L hip/femur - L ARA hardware intact, anterior hip dislocation    Procedure - Conscious sedation, closed reduction  Post-proc PE - NVI  Post-proc XR - reduced ARA

## 2018-03-12 NOTE — ED PROCEDURE NOTE - PROCEDURE ADDITIONAL DETAILS
Patient medicated with multiple doses of propofol, seemed to have a very high tolerance, received total of 500 mg propofol while maintaining stable vitals and returned to her baseline status very rapidly.

## 2018-03-12 NOTE — CONSULT NOTE ADULT - SUBJECTIVE AND OBJECTIVE BOX
HPI  53F complains of left hip pain. Patient reports a few days of mild left hip pain. Since standing up earlier today, she reports an acute onset left hip pain with inability to ambulate since then. Patient denies numbness, paresthesias, headstrike, loss of consciousness, or any other signs/symptoms at this time. Patient is a community ambulator without assistive devices at baseline.    Allergies: NKDA  PMH: anemia, anxiety, appendicitis, bipolar  PSH: BL ARA (Adarsh  Social: Denies tobacco use  FH: Noncontributory  Imaging: ap pelvis / left hip - left anterior ARA dislocation    ROS: Negative for all systems except as noted above in HPI    Physical exam  VS: Afebrile, vital signs stable  Gen: NAD  left LE: hip incision intact. No erythema/ecchymosis/warmth. No TTP bony prominences at knee/ankle/foot.+EHL/FHL/TA/GS. SILT L3-S1, +DP pulse, capillary refill brisk. Compartments soft and nontender.  Secondary survey: No TTP bony prominences with full painless AROM at baseline per patient. SILT. Capillary refill brisk. Able to SLR with contralateral leg. No TTP axial spine.    Procedure - conscious sedation provided by ED attending. Left hip closed reduction attempted unsuccessfully.    53F with left ARA dislocation    Patient will be transferred to [facility] for definitive management.  Bedrest  Pain control  NPO except meds  Hold chemical anticoagulation  IV fluids while NPO  Dr Altamirano aware HPI  53F complains of left hip pain. Patient reports a few days of mild left hip pain. Since standing up earlier today, she reports an acute onset left hip pain with inability to ambulate since then. Patient denies numbness, paresthesias, headstrike, loss of consciousness, or any other signs/symptoms at this time. Patient is a community ambulator without assistive devices at baseline.    Allergies: NKDA  PMH: anemia, anxiety, appendicitis, bipolar  PSH: BL ARA (Adarsh  Social: Denies tobacco use  FH: Noncontributory  Imaging: ap pelvis / left hip - left anterior ARA dislocation    ROS: Negative for all systems except as noted above in HPI    Physical exam  VS: Afebrile, vital signs stable  Gen: NAD  left LE: hip incision intact. No erythema/ecchymosis/warmth. No TTP bony prominences at knee/ankle/foot.+EHL/FHL/TA/GS. SILT L3-S1, +capillary refill brisk. Compartments soft and nontender.  Secondary survey: No TTP bony prominences with full painless AROM at baseline per patient. SILT. Capillary refill brisk. Able to SLR with contralateral leg. No TTP axial spine.    Procedure - conscious sedation provided by ED attending. Left hip closed reduction attempted unsuccessfully.    53F with left ARA dislocation    Patient will be transferred to [facility] for definitive management.  Bedrest  Pain control  NPO except meds  Hold chemical anticoagulation  IV fluids while NPO  Dr Altamirano aware HPI  53F complains of left hip pain. Patient reports a few days of mild left hip pain not triggered by or associated with any specific activity. Since standing up earlier today, she reports an acute onset left hip pain with inability to ambulate since then. Patient denies numbness, paresthesias, headstrike, loss of consciousness, or any other signs/symptoms at this time. Patient is a community ambulator without assistive devices at baseline.    Allergies: NKDA  PMH: anemia, anxiety, appendicitis, bipolar  PSH: BL ARA (Adarsh  Social: Denies tobacco use  FH: Noncontributory  Imaging: ap pelvis / left hip - left anterior ARA dislocation    ROS: Negative for all systems except as noted above in HPI    Physical exam  VS: Afebrile, vital signs stable  Gen: NAD  left LE: hip incision intact. No erythema/ecchymosis/warmth. No TTP bony prominences at knee/ankle/foot.+EHL/FHL/TA/GS. SILT L3-S1, +capillary refill brisk. Compartments soft and nontender.  Secondary survey: No TTP bony prominences with full painless AROM at baseline per patient. SILT. Capillary refill brisk. Able to SLR with contralateral leg. No TTP axial spine.    Procedure - conscious sedation provided by ED attending. Left hip closed reduction attempted unsuccessfully.    53F with left ARA dislocation    Patient will be transferred to [facility] for definitive management.  Bedrest  Pain control  NPO except meds  Hold chemical anticoagulation  IV fluids while NPO  Dr Altamirano aware

## 2018-03-12 NOTE — ED ADULT NURSE REASSESSMENT NOTE - NS ED NURSE REASSESS COMMENT FT1
Patient is being transferred out to [facility], where patifelixt had her hip surgery. Patilent is made aware and direction given to family. Pain med given dilauded 4mg IV push as per Md order. Tolerated well. All papers sighn as per protocol for transferring to ER to ER.
Patient under conscious sedation, patient was given 512mg propofol, noted patient blood pressure 102/63 heart rate is  70. Procedure was unsuccessful, patient still  in pain.
Pt medicated for pain prior to being transferred to [facility]. Pt stated 10/10 pain earlier, was stating 8/10 with the pain increasing to 10/10 briefly. Pt is A&OX4, VS recorded in the chart. Pt in mild distress with lessening symptoms after medication administration. IV on the left forearm was patent and flushed well. Pt transferred, VS recorded in EMR.

## 2018-03-12 NOTE — ED PROCEDURE NOTE - NS_POSTPROCCAREGUIDE_ED_ALL_ED
Patient is now fully awake, with vital signs and temperature stable, hydration is adequate, patients Thai’s  score is at baseline (or greater than 8), patient and escort has received  discharge education.

## 2018-03-12 NOTE — ED ADULT TRIAGE NOTE - CHIEF COMPLAINT QUOTE
Transferred from [facility] for left hip fx.  Appears comfortable and in no apparent distress.  Patient had bilateral hip replacement surgery 6 wks ago.  She was standing and felt her legs give way.  Denies LOC, head trauma or fall.  No past medical history.  20g IV left AC in place on arrival.  Given Dilaudid 2mg and Fentanyl 50mg at [place].

## 2018-03-12 NOTE — ED PROCEDURE NOTE - ATTENDING CONTRIBUTION TO CARE
I performed a face-to-face evaluation of the patient and performed a history and physical examination. I agree with the history and physical examination.    Chris: I was present during the critical part of the procedure.

## 2018-03-12 NOTE — ED PROVIDER NOTE - OBJECTIVE STATEMENT
Chris: 53 F, h/o (B) THR, L THR 6 wks ago, tonight L hip sudden pain when stood up. Transferred from [facility] for L hip dislocation.

## 2018-03-13 ENCOUNTER — RX RENEWAL (OUTPATIENT)
Age: 53
End: 2018-03-13

## 2018-03-13 RX ORDER — HYDROCODONE BITARTRATE AND ACETAMINOPHEN 5; 300 MG/1; MG/1
5-300 TABLET ORAL
Qty: 42 | Refills: 0 | Status: ACTIVE | COMMUNITY
Start: 2018-03-13 | End: 1900-01-01

## 2018-03-13 NOTE — ED POST DISCHARGE NOTE - REASON FOR FOLLOW-UP
Other Admin PA Note: Patient had procedural sedation for hip relocation. Patient contact # [phone] msg left with Admin # and hrs.

## 2018-03-16 ENCOUNTER — APPOINTMENT (OUTPATIENT)
Dept: ORTHOPEDIC SURGERY | Facility: CLINIC | Age: 53
End: 2018-03-16
Payer: MEDICAID

## 2018-03-16 VITALS
BODY MASS INDEX: 21.48 KG/M2 | WEIGHT: 145 LBS | SYSTOLIC BLOOD PRESSURE: 94 MMHG | HEIGHT: 69 IN | DIASTOLIC BLOOD PRESSURE: 60 MMHG | HEART RATE: 93 BPM

## 2018-03-16 PROCEDURE — 99024 POSTOP FOLLOW-UP VISIT: CPT

## 2018-03-16 PROCEDURE — 73502 X-RAY EXAM HIP UNI 2-3 VIEWS: CPT | Mod: LT

## 2018-03-16 RX ORDER — HYDROCODONE BITARTRATE AND ACETAMINOPHEN 10; 300 MG/1; MG/1
10-300 TABLET ORAL
Qty: 60 | Refills: 0 | Status: ACTIVE | COMMUNITY
Start: 2018-03-16 | End: 1900-01-01

## 2018-03-16 RX ORDER — DICLOFENAC SODIUM 75 MG/1
75 TABLET, DELAYED RELEASE ORAL
Qty: 60 | Refills: 0 | Status: ACTIVE | COMMUNITY
Start: 2018-03-16 | End: 1900-01-01

## 2018-03-16 RX ORDER — GABAPENTIN 100 MG/1
100 CAPSULE ORAL 3 TIMES DAILY
Qty: 90 | Refills: 0 | Status: ACTIVE | COMMUNITY
Start: 2018-03-16 | End: 1900-01-01

## 2018-04-02 ENCOUNTER — RX RENEWAL (OUTPATIENT)
Age: 53
End: 2018-04-02

## 2018-04-02 RX ORDER — HYDROCODONE BITARTRATE AND ACETAMINOPHEN 10; 300 MG/1; MG/1
10-300 TABLET ORAL
Qty: 42 | Refills: 0 | Status: ACTIVE | COMMUNITY
Start: 2018-04-02 | End: 1900-01-01

## 2018-04-06 ENCOUNTER — APPOINTMENT (OUTPATIENT)
Dept: ORTHOPEDIC SURGERY | Facility: CLINIC | Age: 53
End: 2018-04-06
Payer: MEDICAID

## 2018-04-06 VITALS
HEART RATE: 97 BPM | BODY MASS INDEX: 21.48 KG/M2 | DIASTOLIC BLOOD PRESSURE: 84 MMHG | SYSTOLIC BLOOD PRESSURE: 137 MMHG | HEIGHT: 69 IN | WEIGHT: 145 LBS

## 2018-04-06 PROCEDURE — 99024 POSTOP FOLLOW-UP VISIT: CPT

## 2018-04-06 PROCEDURE — 73502 X-RAY EXAM HIP UNI 2-3 VIEWS: CPT | Mod: LT

## 2018-04-10 ENCOUNTER — RX RENEWAL (OUTPATIENT)
Age: 53
End: 2018-04-10

## 2018-04-10 RX ORDER — HYDROCODONE BITARTRATE AND ACETAMINOPHEN 10; 300 MG/1; MG/1
10-300 TABLET ORAL
Qty: 60 | Refills: 0 | Status: ACTIVE | COMMUNITY
Start: 2018-04-10 | End: 1900-01-01

## 2018-04-20 ENCOUNTER — RX RENEWAL (OUTPATIENT)
Age: 53
End: 2018-04-20

## 2018-04-20 ENCOUNTER — APPOINTMENT (OUTPATIENT)
Dept: ORTHOPEDIC SURGERY | Facility: CLINIC | Age: 53
End: 2018-04-20

## 2018-04-20 RX ORDER — HYDROCODONE BITARTRATE AND ACETAMINOPHEN 5; 300 MG/1; MG/1
5-300 TABLET ORAL
Qty: 42 | Refills: 0 | Status: ACTIVE | COMMUNITY
Start: 2018-04-20 | End: 1900-01-01

## 2018-04-26 ENCOUNTER — RX RENEWAL (OUTPATIENT)
Age: 53
End: 2018-04-26

## 2018-04-26 RX ORDER — TRAMADOL HYDROCHLORIDE 50 MG/1
50 TABLET, COATED ORAL
Qty: 90 | Refills: 0 | Status: ACTIVE | COMMUNITY
Start: 2018-04-26 | End: 1900-01-01

## 2018-04-27 ENCOUNTER — APPOINTMENT (OUTPATIENT)
Dept: ORTHOPEDIC SURGERY | Facility: CLINIC | Age: 53
End: 2018-04-27

## 2018-04-29 ENCOUNTER — EMERGENCY (EMERGENCY)
Facility: HOSPITAL | Age: 53
LOS: 0 days | Discharge: HOME | End: 2018-04-30
Attending: EMERGENCY MEDICINE
Payer: COMMERCIAL

## 2018-04-29 VITALS
OXYGEN SATURATION: 96 % | WEIGHT: 136.91 LBS | HEIGHT: 66 IN | HEART RATE: 85 BPM | SYSTOLIC BLOOD PRESSURE: 103 MMHG | DIASTOLIC BLOOD PRESSURE: 62 MMHG | RESPIRATION RATE: 17 BRPM | TEMPERATURE: 99 F

## 2018-04-29 DIAGNOSIS — Z96.641 PRESENCE OF RIGHT ARTIFICIAL HIP JOINT: Chronic | ICD-10-CM

## 2018-04-29 DIAGNOSIS — Z90.49 ACQUIRED ABSENCE OF OTHER SPECIFIED PARTS OF DIGESTIVE TRACT: Chronic | ICD-10-CM

## 2018-04-29 DIAGNOSIS — Z98.890 OTHER SPECIFIED POSTPROCEDURAL STATES: Chronic | ICD-10-CM

## 2018-04-29 LAB
ALBUMIN SERPL ELPH-MCNC: 3.4 G/DL — SIGNIFICANT CHANGE UP (ref 3.3–5)
ALP SERPL-CCNC: 89 U/L — SIGNIFICANT CHANGE UP (ref 40–120)
ALT FLD-CCNC: 30 U/L — SIGNIFICANT CHANGE UP (ref 12–78)
AMPHET UR-MCNC: NEGATIVE — SIGNIFICANT CHANGE UP
ANION GAP SERPL CALC-SCNC: 7 MMOL/L — SIGNIFICANT CHANGE UP (ref 5–17)
APAP SERPL-MCNC: <2 UG/ML — LOW (ref 10–30)
AST SERPL-CCNC: 25 U/L — SIGNIFICANT CHANGE UP (ref 15–37)
BARBITURATES UR SCN-MCNC: NEGATIVE — SIGNIFICANT CHANGE UP
BASOPHILS # BLD AUTO: 0.04 K/UL — SIGNIFICANT CHANGE UP (ref 0–0.2)
BASOPHILS NFR BLD AUTO: 0.7 % — SIGNIFICANT CHANGE UP (ref 0–2)
BENZODIAZ UR-MCNC: POSITIVE — SIGNIFICANT CHANGE UP
BILIRUB SERPL-MCNC: 0.4 MG/DL — SIGNIFICANT CHANGE UP (ref 0.2–1.2)
BUN SERPL-MCNC: 8 MG/DL — SIGNIFICANT CHANGE UP (ref 7–23)
CALCIUM SERPL-MCNC: 9 MG/DL — SIGNIFICANT CHANGE UP (ref 8.5–10.1)
CHLORIDE SERPL-SCNC: 106 MMOL/L — SIGNIFICANT CHANGE UP (ref 96–108)
CO2 SERPL-SCNC: 27 MMOL/L — SIGNIFICANT CHANGE UP (ref 22–31)
COCAINE METAB.OTHER UR-MCNC: NEGATIVE — SIGNIFICANT CHANGE UP
CREAT SERPL-MCNC: 0.79 MG/DL — SIGNIFICANT CHANGE UP (ref 0.5–1.3)
EOSINOPHIL # BLD AUTO: 0.23 K/UL — SIGNIFICANT CHANGE UP (ref 0–0.5)
EOSINOPHIL NFR BLD AUTO: 4.1 % — SIGNIFICANT CHANGE UP (ref 0–6)
ETHANOL SERPL-MCNC: <10 MG/DL — SIGNIFICANT CHANGE UP (ref 0–10)
GLUCOSE SERPL-MCNC: 84 MG/DL — SIGNIFICANT CHANGE UP (ref 70–99)
HCT VFR BLD CALC: 35 % — SIGNIFICANT CHANGE UP (ref 34.5–45)
HGB BLD-MCNC: 11.3 G/DL — LOW (ref 11.5–15.5)
IMM GRANULOCYTES NFR BLD AUTO: 0.2 % — SIGNIFICANT CHANGE UP (ref 0–1.5)
LYMPHOCYTES # BLD AUTO: 2.49 K/UL — SIGNIFICANT CHANGE UP (ref 1–3.3)
LYMPHOCYTES # BLD AUTO: 44.1 % — HIGH (ref 13–44)
MCHC RBC-ENTMCNC: 28.9 PG — SIGNIFICANT CHANGE UP (ref 27–34)
MCHC RBC-ENTMCNC: 32.3 GM/DL — SIGNIFICANT CHANGE UP (ref 32–36)
MCV RBC AUTO: 89.5 FL — SIGNIFICANT CHANGE UP (ref 80–100)
METHADONE UR-MCNC: NEGATIVE — SIGNIFICANT CHANGE UP
MONOCYTES # BLD AUTO: 0.41 K/UL — SIGNIFICANT CHANGE UP (ref 0–0.9)
MONOCYTES NFR BLD AUTO: 7.3 % — SIGNIFICANT CHANGE UP (ref 2–14)
NEUTROPHILS # BLD AUTO: 2.47 K/UL — SIGNIFICANT CHANGE UP (ref 1.8–7.4)
NEUTROPHILS NFR BLD AUTO: 43.6 % — SIGNIFICANT CHANGE UP (ref 43–77)
NRBC # BLD: 0 /100 WBCS — SIGNIFICANT CHANGE UP (ref 0–0)
OPIATES UR-MCNC: NEGATIVE — SIGNIFICANT CHANGE UP
PCP SPEC-MCNC: SIGNIFICANT CHANGE UP
PCP UR-MCNC: NEGATIVE — SIGNIFICANT CHANGE UP
PLATELET # BLD AUTO: 325 K/UL — SIGNIFICANT CHANGE UP (ref 150–400)
POTASSIUM SERPL-MCNC: 3.9 MMOL/L — SIGNIFICANT CHANGE UP (ref 3.5–5.3)
POTASSIUM SERPL-SCNC: 3.9 MMOL/L — SIGNIFICANT CHANGE UP (ref 3.5–5.3)
PROT SERPL-MCNC: 7.1 GM/DL — SIGNIFICANT CHANGE UP (ref 6–8.3)
RBC # BLD: 3.91 M/UL — SIGNIFICANT CHANGE UP (ref 3.8–5.2)
RBC # FLD: 17.7 % — HIGH (ref 10.3–14.5)
SALICYLATES SERPL-MCNC: <1.7 MG/DL — LOW (ref 2.8–20)
SODIUM SERPL-SCNC: 140 MMOL/L — SIGNIFICANT CHANGE UP (ref 135–145)
THC UR QL: NEGATIVE — SIGNIFICANT CHANGE UP
WBC # BLD: 5.65 K/UL — SIGNIFICANT CHANGE UP (ref 3.8–10.5)
WBC # FLD AUTO: 5.65 K/UL — SIGNIFICANT CHANGE UP (ref 3.8–10.5)

## 2018-04-29 PROCEDURE — 99284 EMERGENCY DEPT VISIT MOD MDM: CPT

## 2018-04-29 NOTE — ED ADULT TRIAGE NOTE - CHIEF COMPLAINT QUOTE
found sitting on floor in hospital lobby, patient came to visit sister and came down to eat something .  patient lethargic falling asleep. able to move all extremities on command, speech slightly slurred no facial droop   admits to taking valium/ Dilaudid last night

## 2018-04-29 NOTE — ED PROVIDER NOTE - MEDICAL DECISION MAKING DETAILS
spoke with sister who is admitted who stated patient has history of bipolar and on multiple medication, observation til sobriety, pulse ox

## 2018-04-29 NOTE — ED PROVIDER NOTE - PHYSICAL EXAMINATION
General:   intoxicated  Head:     NC/AT, EOMI, oral mucosa moist  Neck:     trachea midline  Lungs:     CTA b/l, no w/r/r  CVS:     S1S2, RRR, no m/g/r  Abd:     +BS, s/nt/nd, no organomegaly  Ext:    2+ radial and pedal pulses, no c/c/e  Neuro: drowsy, but arousable, no acute neuro deficits.

## 2018-04-29 NOTE — ED ADULT NURSE NOTE - OBJECTIVE STATEMENT
pt says she is here because she took pain pills,   pt is lethargic, pt says she is here because she took pain medication, friend gave her pain medication via IV. "my friend said if you want to be happy try this" states that her friend told her that was Dilaudid around 3am    pt is lethargic  pt is requesting to go to the psychiatric unit for depression  Denies S/I H/I

## 2018-05-01 DIAGNOSIS — Z79.891 LONG TERM (CURRENT) USE OF OPIATE ANALGESIC: ICD-10-CM
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
## 2018-05-01 DIAGNOSIS — Z79.82 LONG TERM (CURRENT) USE OF ASPIRIN: ICD-10-CM

## 2018-05-01 DIAGNOSIS — Z79.899 OTHER LONG TERM (CURRENT) DRUG THERAPY: ICD-10-CM

## 2018-05-01 DIAGNOSIS — F41.9 ANXIETY DISORDER, UNSPECIFIED: ICD-10-CM

## 2018-05-01 DIAGNOSIS — F31.9 BIPOLAR DISORDER, UNSPECIFIED: ICD-10-CM

## 2018-05-01 DIAGNOSIS — T40.2X5A ADVERSE EFFECT OF OTHER OPIOIDS, INITIAL ENCOUNTER: ICD-10-CM
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
## 2018-05-01 DIAGNOSIS — Z96.641 PRESENCE OF RIGHT ARTIFICIAL HIP JOINT: ICD-10-CM

## 2018-05-01 DIAGNOSIS — T43.595A ADVERSE EFFECT OF OTHER ANTIPSYCHOTICS AND NEUROLEPTICS, INITIAL ENCOUNTER: ICD-10-CM

## 2018-05-01 DIAGNOSIS — T40.4X5A ADVERSE EFFECT OF OTHER SYNTHETIC NARCOTICS, INITIAL ENCOUNTER: ICD-10-CM

## 2018-05-01 DIAGNOSIS — R42 DIZZINESS AND GIDDINESS: ICD-10-CM

## 2018-05-01 DIAGNOSIS — T42.4X5A ADVERSE EFFECT OF BENZODIAZEPINES, INITIAL ENCOUNTER: ICD-10-CM

## 2018-05-01 DIAGNOSIS — D64.9 ANEMIA, UNSPECIFIED: ICD-10-CM

## 2018-05-02 ENCOUNTER — MOBILE ON CALL (OUTPATIENT)
Age: 53
End: 2018-05-02

## 2018-05-03 RX ORDER — TRAMADOL HYDROCHLORIDE 50 MG/1
50 TABLET, COATED ORAL
Qty: 90 | Refills: 0 | Status: ACTIVE | COMMUNITY
Start: 2018-05-03 | End: 1900-01-01

## 2018-05-18 ENCOUNTER — APPOINTMENT (OUTPATIENT)
Dept: ORTHOPEDIC SURGERY | Facility: CLINIC | Age: 53
End: 2018-05-18
Payer: MEDICAID

## 2018-05-18 VITALS
BODY MASS INDEX: 21.48 KG/M2 | SYSTOLIC BLOOD PRESSURE: 133 MMHG | WEIGHT: 145 LBS | HEART RATE: 96 BPM | DIASTOLIC BLOOD PRESSURE: 87 MMHG | HEIGHT: 69 IN

## 2018-05-18 PROCEDURE — 99214 OFFICE O/P EST MOD 30 MIN: CPT

## 2018-05-18 PROCEDURE — 73502 X-RAY EXAM HIP UNI 2-3 VIEWS: CPT | Mod: LT

## 2018-06-22 ENCOUNTER — APPOINTMENT (OUTPATIENT)
Dept: ORTHOPEDIC SURGERY | Facility: CLINIC | Age: 53
End: 2018-06-22
Payer: MEDICAID

## 2018-06-22 VITALS
WEIGHT: 145 LBS | SYSTOLIC BLOOD PRESSURE: 117 MMHG | HEART RATE: 84 BPM | DIASTOLIC BLOOD PRESSURE: 77 MMHG | HEIGHT: 69 IN | BODY MASS INDEX: 21.48 KG/M2

## 2018-06-22 DIAGNOSIS — T84.029A DISLOCATION OF UNSPECIFIED INTERNAL JOINT PROSTHESIS, INITIAL ENCOUNTER: ICD-10-CM

## 2018-06-22 DIAGNOSIS — Z96.649 DISLOCATION OF UNSPECIFIED INTERNAL JOINT PROSTHESIS, INITIAL ENCOUNTER: ICD-10-CM

## 2018-06-22 DIAGNOSIS — Z96.642 PRESENCE OF LEFT ARTIFICIAL HIP JOINT: ICD-10-CM

## 2018-06-22 PROCEDURE — 73502 X-RAY EXAM HIP UNI 2-3 VIEWS: CPT | Mod: LT

## 2018-06-22 PROCEDURE — 99213 OFFICE O/P EST LOW 20 MIN: CPT

## 2018-06-22 RX ORDER — NAPROXEN SODIUM 550 MG/1
550 TABLET ORAL
Qty: 60 | Refills: 0 | Status: ACTIVE | COMMUNITY
Start: 2018-06-22 | End: 1900-01-01

## 2018-06-22 RX ORDER — TRAMADOL HYDROCHLORIDE 50 MG/1
50 TABLET, COATED ORAL
Qty: 90 | Refills: 0 | Status: ACTIVE | COMMUNITY
Start: 2018-06-22 | End: 1900-01-01

## 2018-07-06 ENCOUNTER — RX RENEWAL (OUTPATIENT)
Age: 53
End: 2018-07-06

## 2018-07-28 PROBLEM — Z78.9 ALCOHOL USE: Status: INACTIVE | Noted: 2017-05-12

## 2018-07-28 PROBLEM — M47.817 LUMBOSACRAL SPONDYLOSIS: Status: ACTIVE | Noted: 2017-10-27

## 2018-08-09 PROBLEM — D64.9 ANEMIA, UNSPECIFIED: Chronic | Status: ACTIVE | Noted: 2017-11-07

## 2018-08-09 PROBLEM — M16.11 UNILATERAL PRIMARY OSTEOARTHRITIS, RIGHT HIP: Chronic | Status: ACTIVE | Noted: 2017-11-07

## 2018-08-09 PROBLEM — F31.30 BIPOLAR DISORDER, CURRENT EPISODE DEPRESSED, MILD OR MODERATE SEVERITY, UNSPECIFIED: Chronic | Status: ACTIVE | Noted: 2017-07-31

## 2018-08-09 PROBLEM — M25.859 OTHER SPECIFIED JOINT DISORDERS, UNSPECIFIED HIP: Chronic | Status: ACTIVE | Noted: 2017-07-31

## 2018-08-09 PROBLEM — F41.9 ANXIETY DISORDER, UNSPECIFIED: Chronic | Status: ACTIVE | Noted: 2018-01-18

## 2018-08-10 ENCOUNTER — APPOINTMENT (OUTPATIENT)
Dept: ORTHOPEDIC SURGERY | Facility: CLINIC | Age: 53
End: 2018-08-10
Payer: MEDICARE

## 2018-08-10 ENCOUNTER — RX RENEWAL (OUTPATIENT)
Age: 53
End: 2018-08-10

## 2018-08-10 DIAGNOSIS — Z96.642 PRESENCE OF LEFT ARTIFICIAL HIP JOINT: ICD-10-CM

## 2018-08-10 PROCEDURE — 99214 OFFICE O/P EST MOD 30 MIN: CPT

## 2018-08-10 RX ORDER — DICLOFENAC SODIUM 75 MG/1
75 TABLET, DELAYED RELEASE ORAL
Qty: 60 | Refills: 0 | Status: ACTIVE | COMMUNITY
Start: 2018-08-10 | End: 1900-01-01

## 2018-08-10 RX ORDER — DICLOFENAC SODIUM 75 MG/1
75 TABLET, DELAYED RELEASE ORAL
Qty: 60 | Refills: 1 | Status: ACTIVE | COMMUNITY
Start: 2018-08-10 | End: 1900-01-01

## 2018-08-14 PROBLEM — Z96.642 STATUS POST TOTAL REPLACEMENT OF LEFT HIP: Status: ACTIVE | Noted: 2018-02-09

## 2018-09-28 ENCOUNTER — APPOINTMENT (OUTPATIENT)
Dept: ORTHOPEDIC SURGERY | Facility: CLINIC | Age: 53
End: 2018-09-28

## 2018-12-09 ENCOUNTER — INPATIENT (INPATIENT)
Facility: HOSPITAL | Age: 53
LOS: 0 days | Discharge: PSYCHIATRIC OR SUBSTANCE USE FACILITY | End: 2018-12-09
Attending: INTERNAL MEDICINE | Admitting: INTERNAL MEDICINE
Payer: MEDICARE

## 2018-12-09 ENCOUNTER — INPATIENT (INPATIENT)
Facility: HOSPITAL | Age: 53
LOS: 7 days | Discharge: HOME | End: 2018-12-17
Attending: PSYCHIATRY & NEUROLOGY | Admitting: PSYCHIATRY & NEUROLOGY
Payer: MEDICARE

## 2018-12-09 VITALS — HEIGHT: 66 IN | OXYGEN SATURATION: 100 % | WEIGHT: 149.91 LBS | RESPIRATION RATE: 17 BRPM | TEMPERATURE: 98 F

## 2018-12-09 VITALS
WEIGHT: 149.91 LBS | DIASTOLIC BLOOD PRESSURE: 75 MMHG | HEIGHT: 66 IN | SYSTOLIC BLOOD PRESSURE: 118 MMHG | OXYGEN SATURATION: 100 % | HEART RATE: 79 BPM | TEMPERATURE: 97 F | RESPIRATION RATE: 17 BRPM

## 2018-12-09 VITALS
RESPIRATION RATE: 15 BRPM | OXYGEN SATURATION: 97 % | SYSTOLIC BLOOD PRESSURE: 101 MMHG | HEART RATE: 73 BPM | TEMPERATURE: 98 F | DIASTOLIC BLOOD PRESSURE: 65 MMHG

## 2018-12-09 DIAGNOSIS — F14.90 COCAINE USE, UNSPECIFIED, UNCOMPLICATED: ICD-10-CM

## 2018-12-09 DIAGNOSIS — Z98.890 OTHER SPECIFIED POSTPROCEDURAL STATES: Chronic | ICD-10-CM

## 2018-12-09 DIAGNOSIS — Z90.49 ACQUIRED ABSENCE OF OTHER SPECIFIED PARTS OF DIGESTIVE TRACT: Chronic | ICD-10-CM

## 2018-12-09 DIAGNOSIS — Z96.641 PRESENCE OF RIGHT ARTIFICIAL HIP JOINT: Chronic | ICD-10-CM

## 2018-12-09 DIAGNOSIS — R69 ILLNESS, UNSPECIFIED: ICD-10-CM

## 2018-12-09 DIAGNOSIS — F25.1 SCHIZOAFFECTIVE DISORDER, DEPRESSIVE TYPE: ICD-10-CM

## 2018-12-09 DIAGNOSIS — T50.902A POISONING BY UNSPECIFIED DRUGS, MEDICAMENTS AND BIOLOGICAL SUBSTANCES, INTENTIONAL SELF-HARM, INITIAL ENCOUNTER: ICD-10-CM

## 2018-12-09 LAB
ALBUMIN SERPL ELPH-MCNC: 3 G/DL — LOW (ref 3.3–5)
ALBUMIN SERPL ELPH-MCNC: 3.3 G/DL — SIGNIFICANT CHANGE UP (ref 3.3–5)
ALP SERPL-CCNC: 78 U/L — SIGNIFICANT CHANGE UP (ref 40–120)
ALP SERPL-CCNC: 78 U/L — SIGNIFICANT CHANGE UP (ref 40–120)
ALT FLD-CCNC: 28 U/L — SIGNIFICANT CHANGE UP (ref 12–78)
ALT FLD-CCNC: 31 U/L — SIGNIFICANT CHANGE UP (ref 12–78)
AMPHET UR-MCNC: NEGATIVE — SIGNIFICANT CHANGE UP
ANION GAP SERPL CALC-SCNC: 5 MMOL/L — SIGNIFICANT CHANGE UP (ref 5–17)
ANION GAP SERPL CALC-SCNC: 8 MMOL/L — SIGNIFICANT CHANGE UP (ref 5–17)
APAP SERPL-MCNC: < 2 UG/ML (ref 10–30)
APAP SERPL-MCNC: < 2 UG/ML (ref 10–30)
APPEARANCE UR: CLEAR — SIGNIFICANT CHANGE UP
AST SERPL-CCNC: 29 U/L — SIGNIFICANT CHANGE UP (ref 15–37)
AST SERPL-CCNC: 37 U/L — SIGNIFICANT CHANGE UP (ref 15–37)
BARBITURATES UR SCN-MCNC: NEGATIVE — SIGNIFICANT CHANGE UP
BASOPHILS # BLD AUTO: 0.04 K/UL — SIGNIFICANT CHANGE UP (ref 0–0.2)
BASOPHILS NFR BLD AUTO: 0.8 % — SIGNIFICANT CHANGE UP (ref 0–2)
BENZODIAZ UR-MCNC: POSITIVE — SIGNIFICANT CHANGE UP
BILIRUB SERPL-MCNC: 0.2 MG/DL — SIGNIFICANT CHANGE UP (ref 0.2–1.2)
BILIRUB SERPL-MCNC: 0.3 MG/DL — SIGNIFICANT CHANGE UP (ref 0.2–1.2)
BILIRUB UR-MCNC: NEGATIVE — SIGNIFICANT CHANGE UP
BUN SERPL-MCNC: 7 MG/DL — SIGNIFICANT CHANGE UP (ref 7–23)
BUN SERPL-MCNC: 8 MG/DL — SIGNIFICANT CHANGE UP (ref 7–23)
CALCIUM SERPL-MCNC: 8.2 MG/DL — LOW (ref 8.5–10.1)
CALCIUM SERPL-MCNC: 8.3 MG/DL — LOW (ref 8.5–10.1)
CHLORIDE SERPL-SCNC: 109 MMOL/L — HIGH (ref 96–108)
CHLORIDE SERPL-SCNC: 111 MMOL/L — HIGH (ref 96–108)
CO2 SERPL-SCNC: 26 MMOL/L — SIGNIFICANT CHANGE UP (ref 22–31)
CO2 SERPL-SCNC: 28 MMOL/L — SIGNIFICANT CHANGE UP (ref 22–31)
COCAINE METAB.OTHER UR-MCNC: POSITIVE — SIGNIFICANT CHANGE UP
COLOR SPEC: YELLOW — SIGNIFICANT CHANGE UP
CREAT SERPL-MCNC: 0.93 MG/DL — SIGNIFICANT CHANGE UP (ref 0.5–1.3)
CREAT SERPL-MCNC: 1.01 MG/DL — SIGNIFICANT CHANGE UP (ref 0.5–1.3)
DIFF PNL FLD: NEGATIVE — SIGNIFICANT CHANGE UP
EOSINOPHIL # BLD AUTO: 0.29 K/UL — SIGNIFICANT CHANGE UP (ref 0–0.5)
EOSINOPHIL NFR BLD AUTO: 5.8 % — SIGNIFICANT CHANGE UP (ref 0–6)
ETHANOL SERPL-MCNC: <10 MG/DL — SIGNIFICANT CHANGE UP (ref 0–10)
GLUCOSE SERPL-MCNC: 105 MG/DL — HIGH (ref 70–99)
GLUCOSE SERPL-MCNC: 95 MG/DL — SIGNIFICANT CHANGE UP (ref 70–99)
GLUCOSE UR QL: NEGATIVE MG/DL — SIGNIFICANT CHANGE UP
HAV IGM SER-ACNC: SIGNIFICANT CHANGE UP
HBV CORE IGM SER-ACNC: SIGNIFICANT CHANGE UP
HBV SURFACE AG SER-ACNC: SIGNIFICANT CHANGE UP
HCG SERPL-ACNC: 2 MIU/ML — SIGNIFICANT CHANGE UP
HCT VFR BLD CALC: 36.9 % — SIGNIFICANT CHANGE UP (ref 34.5–45)
HCV AB S/CO SERPL IA: 0.11 S/CO — SIGNIFICANT CHANGE UP
HCV AB SERPL-IMP: SIGNIFICANT CHANGE UP
HGB BLD-MCNC: 11.9 G/DL — SIGNIFICANT CHANGE UP (ref 11.5–15.5)
HIV 1+2 AB+HIV1 P24 AG SERPL QL IA: SIGNIFICANT CHANGE UP
IMM GRANULOCYTES NFR BLD AUTO: 0 % — SIGNIFICANT CHANGE UP (ref 0–1.5)
KETONES UR-MCNC: NEGATIVE — SIGNIFICANT CHANGE UP
LEUKOCYTE ESTERASE UR-ACNC: NEGATIVE — SIGNIFICANT CHANGE UP
LYMPHOCYTES # BLD AUTO: 2.14 K/UL — SIGNIFICANT CHANGE UP (ref 1–3.3)
LYMPHOCYTES # BLD AUTO: 42.7 % — SIGNIFICANT CHANGE UP (ref 13–44)
MCHC RBC-ENTMCNC: 29.7 PG — SIGNIFICANT CHANGE UP (ref 27–34)
MCHC RBC-ENTMCNC: 32.2 GM/DL — SIGNIFICANT CHANGE UP (ref 32–36)
MCV RBC AUTO: 92 FL — SIGNIFICANT CHANGE UP (ref 80–100)
METHADONE UR-MCNC: NEGATIVE — SIGNIFICANT CHANGE UP
MONOCYTES # BLD AUTO: 0.45 K/UL — SIGNIFICANT CHANGE UP (ref 0–0.9)
MONOCYTES NFR BLD AUTO: 9 % — SIGNIFICANT CHANGE UP (ref 2–14)
NEUTROPHILS # BLD AUTO: 2.09 K/UL — SIGNIFICANT CHANGE UP (ref 1.8–7.4)
NEUTROPHILS NFR BLD AUTO: 41.7 % — LOW (ref 43–77)
NITRITE UR-MCNC: NEGATIVE — SIGNIFICANT CHANGE UP
NRBC # BLD: 0 /100 WBCS — SIGNIFICANT CHANGE UP (ref 0–0)
OPIATES UR-MCNC: POSITIVE — SIGNIFICANT CHANGE UP
PCP SPEC-MCNC: SIGNIFICANT CHANGE UP
PCP UR-MCNC: NEGATIVE — SIGNIFICANT CHANGE UP
PH UR: 5 — SIGNIFICANT CHANGE UP (ref 5–8)
PLATELET # BLD AUTO: 279 K/UL — SIGNIFICANT CHANGE UP (ref 150–400)
POTASSIUM SERPL-MCNC: 3.4 MMOL/L — LOW (ref 3.5–5.3)
POTASSIUM SERPL-MCNC: 4 MMOL/L — SIGNIFICANT CHANGE UP (ref 3.5–5.3)
POTASSIUM SERPL-SCNC: 3.4 MMOL/L — LOW (ref 3.5–5.3)
POTASSIUM SERPL-SCNC: 4 MMOL/L — SIGNIFICANT CHANGE UP (ref 3.5–5.3)
PROT SERPL-MCNC: 6.6 GM/DL — SIGNIFICANT CHANGE UP (ref 6–8.3)
PROT SERPL-MCNC: 6.9 GM/DL — SIGNIFICANT CHANGE UP (ref 6–8.3)
PROT UR-MCNC: NEGATIVE MG/DL — SIGNIFICANT CHANGE UP
RBC # BLD: 4.01 M/UL — SIGNIFICANT CHANGE UP (ref 3.8–5.2)
RBC # FLD: 13.3 % — SIGNIFICANT CHANGE UP (ref 10.3–14.5)
SALICYLATES SERPL-MCNC: <1.7 MG/DL (ref 2.8–20)
SODIUM SERPL-SCNC: 142 MMOL/L — SIGNIFICANT CHANGE UP (ref 135–145)
SODIUM SERPL-SCNC: 145 MMOL/L — SIGNIFICANT CHANGE UP (ref 135–145)
SP GR SPEC: 1.01 — SIGNIFICANT CHANGE UP (ref 1.01–1.02)
THC UR QL: NEGATIVE — SIGNIFICANT CHANGE UP
UROBILINOGEN FLD QL: NEGATIVE MG/DL — SIGNIFICANT CHANGE UP
WBC # BLD: 5.01 K/UL — SIGNIFICANT CHANGE UP (ref 3.8–10.5)
WBC # FLD AUTO: 5.01 K/UL — SIGNIFICANT CHANGE UP (ref 3.8–10.5)

## 2018-12-09 PROCEDURE — 99223 1ST HOSP IP/OBS HIGH 75: CPT | Mod: AI

## 2018-12-09 PROCEDURE — 73120 X-RAY EXAM OF HAND: CPT | Mod: 26,RT

## 2018-12-09 PROCEDURE — 99285 EMERGENCY DEPT VISIT HI MDM: CPT

## 2018-12-09 PROCEDURE — 90792 PSYCH DIAG EVAL W/MED SRVCS: CPT | Mod: GT

## 2018-12-09 PROCEDURE — 12345: CPT | Mod: NC

## 2018-12-09 RX ORDER — QUETIAPINE FUMARATE 200 MG/1
600 TABLET, FILM COATED ORAL AT BEDTIME
Qty: 0 | Refills: 0 | Status: DISCONTINUED | OUTPATIENT
Start: 2018-12-09 | End: 2018-12-09

## 2018-12-09 RX ORDER — GABAPENTIN 400 MG/1
300 CAPSULE ORAL DAILY
Qty: 0 | Refills: 0 | Status: DISCONTINUED | OUTPATIENT
Start: 2018-12-09 | End: 2018-12-09

## 2018-12-09 RX ORDER — DIPHENHYDRAMINE HCL 50 MG
50 CAPSULE ORAL EVERY 6 HOURS
Qty: 0 | Refills: 0 | Status: DISCONTINUED | OUTPATIENT
Start: 2018-12-09 | End: 2018-12-17

## 2018-12-09 RX ORDER — HALOPERIDOL DECANOATE 100 MG/ML
5 INJECTION INTRAMUSCULAR ONCE
Qty: 0 | Refills: 0 | Status: DISCONTINUED | OUTPATIENT
Start: 2018-12-09 | End: 2018-12-17

## 2018-12-09 RX ORDER — MIDAZOLAM HYDROCHLORIDE 1 MG/ML
2 INJECTION, SOLUTION INTRAMUSCULAR; INTRAVENOUS ONCE
Qty: 0 | Refills: 0 | Status: DISCONTINUED | OUTPATIENT
Start: 2018-12-09 | End: 2018-12-09

## 2018-12-09 RX ORDER — VENLAFAXINE HCL 75 MG
225 CAPSULE, EXT RELEASE 24 HR ORAL DAILY
Qty: 0 | Refills: 0 | Status: DISCONTINUED | OUTPATIENT
Start: 2018-12-09 | End: 2018-12-17

## 2018-12-09 RX ORDER — ALPRAZOLAM 0.25 MG
1 TABLET ORAL
Qty: 0 | Refills: 0 | COMMUNITY
Start: 2018-12-09

## 2018-12-09 RX ORDER — VENLAFAXINE HCL 75 MG
150 CAPSULE, EXT RELEASE 24 HR ORAL DAILY
Qty: 0 | Refills: 0 | Status: DISCONTINUED | OUTPATIENT
Start: 2018-12-09 | End: 2018-12-09

## 2018-12-09 RX ORDER — ACETAMINOPHEN 500 MG
650 TABLET ORAL EVERY 6 HOURS
Qty: 0 | Refills: 0 | Status: DISCONTINUED | OUTPATIENT
Start: 2018-12-09 | End: 2018-12-17

## 2018-12-09 RX ORDER — HYDROGEN PEROXIDE 0.3 KG/100L
1 LIQUID TOPICAL ONCE
Qty: 0 | Refills: 0 | Status: COMPLETED | OUTPATIENT
Start: 2018-12-09 | End: 2018-12-09

## 2018-12-09 RX ORDER — GABAPENTIN 400 MG/1
1 CAPSULE ORAL
Qty: 0 | Refills: 0 | COMMUNITY
Start: 2018-12-09

## 2018-12-09 RX ORDER — SODIUM CHLORIDE 9 MG/ML
2000 INJECTION INTRAMUSCULAR; INTRAVENOUS; SUBCUTANEOUS ONCE
Qty: 0 | Refills: 0 | Status: COMPLETED | OUTPATIENT
Start: 2018-12-09 | End: 2018-12-09

## 2018-12-09 RX ORDER — ALPRAZOLAM 0.25 MG
1 TABLET ORAL
Qty: 0 | Refills: 0 | Status: DISCONTINUED | OUTPATIENT
Start: 2018-12-09 | End: 2018-12-09

## 2018-12-09 RX ORDER — HALOPERIDOL DECANOATE 100 MG/ML
5 INJECTION INTRAMUSCULAR EVERY 6 HOURS
Qty: 0 | Refills: 0 | Status: DISCONTINUED | OUTPATIENT
Start: 2018-12-09 | End: 2018-12-17

## 2018-12-09 RX ORDER — ALPRAZOLAM 0.25 MG
1 TABLET ORAL
Qty: 0 | Refills: 0 | Status: DISCONTINUED | OUTPATIENT
Start: 2018-12-09 | End: 2018-12-12

## 2018-12-09 RX ORDER — HALOPERIDOL DECANOATE 100 MG/ML
5 INJECTION INTRAMUSCULAR EVERY 6 HOURS
Qty: 0 | Refills: 0 | Status: DISCONTINUED | OUTPATIENT
Start: 2018-12-09 | End: 2018-12-09

## 2018-12-09 RX ORDER — POTASSIUM CHLORIDE 20 MEQ
20 PACKET (EA) ORAL ONCE
Qty: 0 | Refills: 0 | Status: COMPLETED | OUTPATIENT
Start: 2018-12-09 | End: 2018-12-09

## 2018-12-09 RX ORDER — GABAPENTIN 400 MG/1
300 CAPSULE ORAL DAILY
Qty: 0 | Refills: 0 | Status: DISCONTINUED | OUTPATIENT
Start: 2018-12-09 | End: 2018-12-17

## 2018-12-09 RX ORDER — POTASSIUM CHLORIDE 20 MEQ
20 PACKET (EA) ORAL ONCE
Qty: 0 | Refills: 0 | Status: DISCONTINUED | OUTPATIENT
Start: 2018-12-09 | End: 2018-12-09

## 2018-12-09 RX ORDER — FAMOTIDINE 10 MG/ML
20 INJECTION INTRAVENOUS
Qty: 0 | Refills: 0 | Status: DISCONTINUED | OUTPATIENT
Start: 2018-12-09 | End: 2018-12-17

## 2018-12-09 RX ORDER — ACTIVATED CHARCOAL 25 G/120ML
50 SUSPENSION, ORAL (FINAL DOSE FORM) ORAL ONCE
Qty: 0 | Refills: 0 | Status: COMPLETED | OUTPATIENT
Start: 2018-12-09 | End: 2018-12-09

## 2018-12-09 RX ORDER — QUETIAPINE FUMARATE 200 MG/1
600 TABLET, FILM COATED ORAL AT BEDTIME
Qty: 0 | Refills: 0 | Status: DISCONTINUED | OUTPATIENT
Start: 2018-12-09 | End: 2018-12-13

## 2018-12-09 RX ORDER — QUETIAPINE FUMARATE 200 MG/1
2 TABLET, FILM COATED ORAL
Qty: 0 | Refills: 0 | COMMUNITY
Start: 2018-12-09

## 2018-12-09 RX ORDER — VENLAFAXINE HCL 75 MG
75 CAPSULE, EXT RELEASE 24 HR ORAL DAILY
Qty: 0 | Refills: 0 | Status: DISCONTINUED | OUTPATIENT
Start: 2018-12-09 | End: 2018-12-09

## 2018-12-09 RX ORDER — TETANUS TOXOID, REDUCED DIPHTHERIA TOXOID AND ACELLULAR PERTUSSIS VACCINE, ADSORBED 5; 2.5; 8; 8; 2.5 [IU]/.5ML; [IU]/.5ML; UG/.5ML; UG/.5ML; UG/.5ML
0.5 SUSPENSION INTRAMUSCULAR ONCE
Qty: 0 | Refills: 0 | Status: COMPLETED | OUTPATIENT
Start: 2018-12-09 | End: 2018-12-09

## 2018-12-09 RX ORDER — HALOPERIDOL DECANOATE 100 MG/ML
5 INJECTION INTRAMUSCULAR ONCE
Qty: 0 | Refills: 0 | Status: DISCONTINUED | OUTPATIENT
Start: 2018-12-09 | End: 2018-12-09

## 2018-12-09 RX ORDER — INFLUENZA VIRUS VACCINE 15; 15; 15; 15 UG/.5ML; UG/.5ML; UG/.5ML; UG/.5ML
0.5 SUSPENSION INTRAMUSCULAR ONCE
Qty: 0 | Refills: 0 | Status: DISCONTINUED | OUTPATIENT
Start: 2018-12-09 | End: 2018-12-09

## 2018-12-09 RX ADMIN — Medication 2 MILLIGRAM(S): at 06:08

## 2018-12-09 RX ADMIN — GABAPENTIN 300 MILLIGRAM(S): 400 CAPSULE ORAL at 13:28

## 2018-12-09 RX ADMIN — QUETIAPINE FUMARATE 600 MILLIGRAM(S): 200 TABLET, FILM COATED ORAL at 21:13

## 2018-12-09 RX ADMIN — Medication 2 MILLIGRAM(S): at 07:26

## 2018-12-09 RX ADMIN — HYDROGEN PEROXIDE 1 APPLICATION(S): 0.3 LIQUID TOPICAL at 17:48

## 2018-12-09 RX ADMIN — SODIUM CHLORIDE 2000 MILLILITER(S): 9 INJECTION INTRAMUSCULAR; INTRAVENOUS; SUBCUTANEOUS at 01:18

## 2018-12-09 RX ADMIN — Medication 50 GRAM(S): at 01:12

## 2018-12-09 RX ADMIN — Medication 20 MILLIEQUIVALENT(S): at 15:01

## 2018-12-09 RX ADMIN — SODIUM CHLORIDE 2000 MILLILITER(S): 9 INJECTION INTRAMUSCULAR; INTRAVENOUS; SUBCUTANEOUS at 03:18

## 2018-12-09 RX ADMIN — TETANUS TOXOID, REDUCED DIPHTHERIA TOXOID AND ACELLULAR PERTUSSIS VACCINE, ADSORBED 0.5 MILLILITER(S): 5; 2.5; 8; 8; 2.5 SUSPENSION INTRAMUSCULAR at 02:24

## 2018-12-09 RX ADMIN — Medication 1 MILLIGRAM(S): at 08:59

## 2018-12-09 RX ADMIN — FAMOTIDINE 20 MILLIGRAM(S): 10 INJECTION INTRAVENOUS at 21:13

## 2018-12-09 RX ADMIN — MIDAZOLAM HYDROCHLORIDE 2 MILLIGRAM(S): 1 INJECTION, SOLUTION INTRAMUSCULAR; INTRAVENOUS at 05:08

## 2018-12-09 RX ADMIN — Medication 75 MILLIGRAM(S): at 15:01

## 2018-12-09 RX ADMIN — Medication 1 MILLIGRAM(S): at 21:13

## 2018-12-09 RX ADMIN — Medication 1 MILLIGRAM(S): at 14:09

## 2018-12-09 RX ADMIN — Medication 150 MILLIGRAM(S): at 13:28

## 2018-12-09 RX ADMIN — Medication 1 TABLET(S): at 21:13

## 2018-12-09 RX ADMIN — HALOPERIDOL DECANOATE 5 MILLIGRAM(S): 100 INJECTION INTRAMUSCULAR at 14:07
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
## 2018-12-09 NOTE — ED BEHAVIORAL HEALTH ASSESSMENT NOTE - CURRENT MEDICATION
effexor ER 150mg PO qDay  gabapentin 300mg PO qHS  seroquel 500mg PO qHS  xanax 2mg PO qDay (ISTOP confirmed)  dilauded 4mg PO TID (ISTOP confirmed)
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
## 2018-12-09 NOTE — DISCHARGE NOTE ADULT - MEDICATION SUMMARY - MEDICATIONS TO TAKE
I will START or STAY ON the medications listed below when I get home from the hospital:    aspirin 325 mg oral delayed release tablet  -- 1 tab(s) by mouth 2 times a day MDD:2  -- Indication: For ASA    gabapentin 300 mg oral capsule  -- 1 cap(s) by mouth once a day  -- Indication: For Schizoaffective disorder, depressive type    venlafaxine 225 mg oral tablet, extended release  -- 1 tab(s) by mouth once a day  -- Indication: For Schizoaffective disorder, depressive type    QUEtiapine 300 mg oral tablet  -- 2 tab(s) by mouth once a day (at bedtime)  -- Indication: For Schizoaffective disorder, depressive type    ALPRAZolam 1 mg oral tablet  -- 1 tab(s) by mouth 2 times a day  -- Indication: For Schizoaffective disorder, depressive type    Pepcid 20 mg oral tablet  -- 1 tab(s) by mouth 2 times a day MDD:2  -- It is very important that you take or use this exactly as directed.  Do not skip doses or discontinue unless directed by your doctor.  Obtain medical advice before taking any non-prescription drugs as some may affect the action of this medication.    -- Indication: For GERD    docusate sodium 100 mg oral capsule  -- 1 cap(s) by mouth 3 times a day MDD:3  -- Indication: For Constipation    senna oral tablet  -- 2 tab(s) by mouth once a day (at bedtime), As needed, Constipation MDD:2  -- Indication: For Constipation
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
## 2018-12-09 NOTE — ED ADULT TRIAGE NOTE - AS TEMP SITE
Medication Detail      Disp Refills Start End GIBRAN   buPROPion (WELLBUTRIN XL) 150 MG 24 hr tablet 30 tablet 1 6/30/2017  --   Sig: Take 1 tablet (150 mg) by mouth every morning       Last Office Visit with [facility], [facility] or [facility] prescribing provider:  5/26/17   Next 5 appointments (look out 90 days)     Aug 25, 2017  4:20 PM CDT   [facility]    [id number] [id number] [phone]                   Last PHQ-9 score on record=   PHQ-9 SCORE 6/30/2017   Total Score -   Total Score MyChart -   Total Score 9       Lab Results   Component Value Date    AST 17 05/28/2016     Lab Results   Component Value Date    ALT 28 05/28/2016           Medication Detail      Disp Refills Start End GIBRAN   escitalopram (LEXAPRO) 10 MG tablet 30 tablet 1 6/30/2017  --   Sig: Take 1 tablet (10 mg) by mouth daily       Last Office Visit with [facility] primary care provider:  5/26/17   Next 5 appointments (look out 90 days)     Aug 25, 2017  4:20 PM CDT   MyChart Long with VALDEZ Cowart C[facility]   [facility]    [id number] [address] [phone]                   Last PHQ-9 score on record=   PHQ-9 SCORE 6/30/2017   Total Score -   Total Score MyChart -   Total Score 9                 
Patient has an appointment today, will defer for appointment.  Opal Rodas, RN  Triage Nurse  
oral

## 2018-12-09 NOTE — H&P ADULT - NSHPPHYSICALEXAM_GEN_ALL_CORE
Pt would not allow exam.  PSYCH: pt appears to be psychotic as she is asking for medication to "make the voices stop".  Pt agitated, tearful, +SI/HI.

## 2018-12-09 NOTE — ED BEHAVIORAL HEALTH ASSESSMENT NOTE - RISK ASSESSMENT
risk factors include prior attempts, attempt today, substance use, psychosis. Protective factors include lack of access to firearms, reported remorse for today's attempt. Overall she appears to be at imminently increased risk of suicide and will require hospitalization for safety and stabilization.

## 2018-12-09 NOTE — DISCHARGE NOTE ADULT - SECONDARY DIAGNOSIS.
Schizoaffective disorder, depressive type Cocaine use Deferred condition on axis II Bipolar depression
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
## 2018-12-09 NOTE — DISCHARGE NOTE ADULT - PROVIDER TOKENS
FREE:[LAST:[PCP],PHONE:[(   )    -],FAX:[(   )    -],ADDRESS:[Follow up with PCP 1 week]],FREE:[LAST:[Psych],PHONE:[(   )    -],FAX:[(   )    -],ADDRESS:[Follow up with Psychiatrist 1 week]]

## 2018-12-09 NOTE — PROGRESS NOTE BEHAVIORAL HEALTH - NSBHFUPINTERVALHXFT_PSY_A_CORE
Pt seen and examined. Chart reviewed. Pt is a 52 yo single, domiciled with sister, on mentally disabled due to long hx of Schizoaffective Disorder, hx of substance abuse, multiple inpt psychiatric hospitalizations - last hospitalized > 3years, hx of multiple suicide attempts. She presented to [facility] ER s/p attempted overdose on Xanax (27 pills) and 1000 mg of Seroquel secondary to psychosocial stressors - she reports being in a intense argument with her sister. States that she feels guilty about being a burden to her. Pt allowed me to contact Lorena [phone] - she expressed concerns about her sister - stating that she has been on a decline over the few months as pt is s/p several surgeries - b/l hip replacements, knee surgery and has been in significant pain.  Sister reports that she is responsible for monitoring pts medications. She states that she has been worried about her sister and believes that she requires hospitalization at this time.  upon evaluation of pt, pt reports that she made a mistake and doesn't wish to die. She states that she "just wants to go home", however, she acknowledged that she needs help. She reports that she recently relapsed on cocaine and notes that though this helped "remove the voices" (she did not elaborate on what the voices say, however denied that they were CAH) - states that the cocaine helped the voices, however, intensified her depressive symptoms.  Pt states that in part she wants to live but admits that in part she wishes to die.

## 2018-12-09 NOTE — ED BEHAVIORAL HEALTH ASSESSMENT NOTE - DESCRIPTION
Patient noted to be agitated, attempted to elope, given versed 2mg IM x 1 at 04:29. Placed on 1:1. Anemia, appendicitis 1996, R hip arthritis, prior R hip replacement 11/2017, R hip labral repair 8/14/17 born in [place], grew up with biological parents in [place], completed HS in 4 years regular ed, completed college in 4 years at [employer], worked for [employer] until disability 6 months ago
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
## 2018-12-09 NOTE — DISCHARGE NOTE ADULT - PLAN OF CARE
Seen by psych Discharge to [facility] for continued psych monitoring  Continue Seroquel 600mg at bedtime  Gabapentin 300mg daily  Xanax 1mg BID  Effexor 225mg daily cont meds
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
## 2018-12-09 NOTE — PROGRESS NOTE BEHAVIORAL HEALTH - NSBHCONSULTMEDPLAN_PSY_A_CORE FT
s/p hip/knee surgery - may require prn medication   per pharmacy she picked up Hydromorphone 4 mg po tid prn pain on 11/29/2018 - sister states that he monitors medication and pt does not abuse opiates

## 2018-12-09 NOTE — PROGRESS NOTE BEHAVIORAL HEALTH - NSBHCONSULTPSYCHPLAN_PSY_A_CORE FT
Would resume treatment with pts outpt regimen confirmed by [facility] - [phone]  Seroquel 600 mg qhs   Gabapentin 300 mg po qdaily  Xanax - 1 mg po bid  Effexor 225 mg po qdaily     Haldol 5mg/Ativan 2 mg/Benadryl 50 mg po/im q6hrs prn agitation

## 2018-12-09 NOTE — DISCHARGE NOTE ADULT - CARE PROVIDER_API CALL
PCP,   Follow up with PCP 1 week  Phone: (   )    -  Fax: (   )    -    Psych,   Follow up with Psychiatrist 1 week  Phone: (   )    -  Fax: (   )    -
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
## 2018-12-09 NOTE — H&P ADULT - NSHPLABSRESULTS_GEN_ALL_CORE
Vital Signs Last 24 Hrs  T(C): 37 (09 Dec 2018 07:16), Max: 37 (09 Dec 2018 07:16)  T(F): 98.6 (09 Dec 2018 07:16), Max: 98.6 (09 Dec 2018 07:16)  HR: 72 (09 Dec 2018 07:16) (69 - 81)  BP: 126/78 (09 Dec 2018 07:16) (105/63 - 126/78)  BP(mean): --  RR: 18 (09 Dec 2018 07:16) (16 - 18)  SpO2: 99% (09 Dec 2018 07:16) (98% - 100%)          LABS:                        11.9   5.01  )-----------( 279      ( 09 Dec 2018 01:14 )             36.9     12    145  |  111<H>  |  7   ----------------------------<  105<H>  3.4<L>   |  26  |  1.01    Ca    8.2<L>      09 Dec 2018 05:16    TPro  6.6  /  Alb  3.0<L>  /  TBili  0.2  /  DBili  x   /  AST  29  /  ALT  28  /  AlkPhos  78  12-09      Urinalysis Basic - ( 09 Dec 2018 03:12 )    Color: Yellow / Appearance: Clear / S.015 / pH: x  Gluc: x / Ketone: Negative  / Bili: Negative / Urobili: Negative mg/dL   Blood: x / Protein: Negative mg/dL / Nitrite: Negative   Leuk Esterase: Negative / RBC: x / WBC x   Sq Epi: x / Non Sq Epi: x / Bacteria: x

## 2018-12-09 NOTE — ED BEHAVIORAL HEALTH ASSESSMENT NOTE - AXIS III
Anemia  Appendicitis  1996, right hip replacement and correction   H/O colonoscopy  H/O dilation and curettage  History of appendectomy

## 2018-12-09 NOTE — ED BEHAVIORAL HEALTH ASSESSMENT NOTE - NS ED BHA PLAN ADMIT TO [facility] CONTACTED FT
[phone] message left with answering service of Dr. Au at [phone] informing him of clinical course and requesting call back

## 2018-12-09 NOTE — DISCHARGE NOTE ADULT - PATIENT PORTAL LINK FT
You can access the [facility] Patient Portal, offered by [facility], by registering with the following website: [email or web address]

## 2018-12-09 NOTE — DISCHARGE NOTE ADULT - CARE PLAN
Principal Discharge DX:	Intentional drug overdose, initial encounter  Goal:	Seen by psych  Assessment and plan of treatment:	Discharge to [facility] for continued psych monitoring  Continue Seroquel 600mg at bedtime  Gabapentin 300mg daily  Xanax 1mg BID  Effexor 225mg daily  Secondary Diagnosis:	Schizoaffective disorder, depressive type  Goal:	cont meds  Secondary Diagnosis:	Cocaine use  Secondary Diagnosis:	Deferred condition on axis II  Secondary Diagnosis:	Bipolar depression

## 2018-12-09 NOTE — PROGRESS NOTE BEHAVIORAL HEALTH - NSBHCONSULTMEDS_PSY_A_CORE FT
Seroquel 600 mg qhs   Gabapentin 300 mg po qdaily  Xanax - 1 mg po bid  Effexor 225 mg po qdaily     Hydromorphone 4 mg last picked up 11/29 - 3x daily prn

## 2018-12-09 NOTE — ED PROVIDER NOTE - MEDICAL DECISION MAKING DETAILS
labs reviewed. patient received ivfs. patient asymptomatic for benzos after 4 hours and continued to try to run out of ED and act agitated thus given versed. Patient still requires management for possibel seroquel OD. She is now admitted to medicine for further management.

## 2018-12-09 NOTE — PROGRESS NOTE BEHAVIORAL HEALTH - SUMMARY
Pt seen and examined. Chart reviewed. Pt is a 52 yo single, domiciled with sister, on mentally disabled due to long hx of Schizoaffective Disorder, hx of substance abuse, multiple inpt psychiatric hospitalizations - last hospitalized > 3years, hx of multiple suicide attempts. She presented to [facility] ER s/p attempted overdose on Xanax (27 pills) and 1000 mg of Seroquel secondary to psychosocial stressors - she reports being in a intense argument with her sister. States that she feels guilty about being a burden to her. Pt allowed me to contact Lorena [phone] - she expressed concerns about her sister - stating that she has been on a decline over the few months as pt is s/p several surgeries - b/l hip replacements, knee surgery and has been in significant pain.  Sister reports that she is responsible for monitoring pts medications. She states that she has been worried about her sister and believes that she requires hospitalization at this time.  upon evaluation of pt, pt reports that she made a mistake and doesn't wish to die. She states that she "just wants to go home", however, she acknowledged that she needs help. She reports that she recently relapsed on cocaine and notes that though this helped "remove the voices" (she did not elaborate on what the voices say, however denied that they were CAH) - states that the cocaine helped the voices, however, intensified her depressive symptoms.  Pt states that in part she wants to live but admits that in part she wishes to die.    At this time pt presents an acute danger to self and requires acute inpt psychiatric hospitalizations for stabilization and med mgmt.  2PC completed and placed in the chart.

## 2018-12-09 NOTE — ED PROVIDER NOTE - CONSTITUTIONAL, MLM
normal... Well appearing, well nourished, awake, alert, oriented to person, place, time/situation and tearful

## 2018-12-09 NOTE — H&P ADULT - PROBLEM SELECTOR PLAN 1
Telepsych consult, official psychiatry consult awaited  Hold all meds pending official psych consult  Will order Ativan PRN as pt received in ED for agitation without adverse effects.  Keep on telemetry  Per poison control, patient needs 12 hours on tele because of Seroquel OD.  At 12 noon today, patient will be medically cleared for transfer to inpatient psych if no changes on telemetry.  Suicide precautions  Continue 1:1 and restraints as patient is a danger to herself and others at this time. Telepsych consult appreciated, official psychiatry consult awaited  Hold all meds pending official psych consult  Will order Ativan PRN as pt received in ED for agitation without adverse effects.  Keep on telemetry  Per poison control, patient needs 12 hours on tele because of Seroquel OD.  At 12 noon today, patient will be medically cleared for transfer to inpatient psych if no changes on telemetry.  Suicide precautions  Continue 1:1 and restraints as patient is a danger to herself and others at this time.

## 2018-12-10 LAB
CHOLEST SERPL-MCNC: 198 MG/DL — SIGNIFICANT CHANGE UP (ref 120–199)
HBA1C BLD-MCNC: 4.9 % — SIGNIFICANT CHANGE UP (ref 4–5.6)
HDLC SERPL-MCNC: 52 MG/DL — SIGNIFICANT CHANGE UP (ref 45–65)
LIPID PNL WITH DIRECT LDL SERPL: 143 MG/DL — SIGNIFICANT CHANGE UP
TRIGL SERPL-MCNC: 91 MG/DL — SIGNIFICANT CHANGE UP (ref 10–149)
TSH SERPL-MCNC: 1 UIU/ML — SIGNIFICANT CHANGE UP (ref 0.27–4.2)

## 2018-12-10 PROCEDURE — 99232 SBSQ HOSP IP/OBS MODERATE 35: CPT

## 2018-12-10 PROCEDURE — 99223 1ST HOSP IP/OBS HIGH 75: CPT

## 2018-12-10 RX ADMIN — Medication 1 MILLIGRAM(S): at 21:15

## 2018-12-10 RX ADMIN — QUETIAPINE FUMARATE 600 MILLIGRAM(S): 200 TABLET, FILM COATED ORAL at 21:15

## 2018-12-10 RX ADMIN — Medication 1 MILLIGRAM(S): at 08:50

## 2018-12-10 RX ADMIN — GABAPENTIN 300 MILLIGRAM(S): 400 CAPSULE ORAL at 08:50

## 2018-12-10 RX ADMIN — FAMOTIDINE 20 MILLIGRAM(S): 10 INJECTION INTRAVENOUS at 21:15

## 2018-12-10 RX ADMIN — Medication 1 TABLET(S): at 11:31

## 2018-12-10 RX ADMIN — Medication 225 MILLIGRAM(S): at 08:50

## 2018-12-10 RX ADMIN — FAMOTIDINE 20 MILLIGRAM(S): 10 INJECTION INTRAVENOUS at 08:50

## 2018-12-10 RX ADMIN — Medication 1 TABLET(S): at 21:15
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
## 2018-12-11 PROCEDURE — 99232 SBSQ HOSP IP/OBS MODERATE 35: CPT

## 2018-12-11 RX ADMIN — QUETIAPINE FUMARATE 600 MILLIGRAM(S): 200 TABLET, FILM COATED ORAL at 20:44

## 2018-12-11 RX ADMIN — FAMOTIDINE 20 MILLIGRAM(S): 10 INJECTION INTRAVENOUS at 08:48

## 2018-12-11 RX ADMIN — Medication 1 TABLET(S): at 20:44

## 2018-12-11 RX ADMIN — Medication 1 TABLET(S): at 08:48

## 2018-12-11 RX ADMIN — GABAPENTIN 300 MILLIGRAM(S): 400 CAPSULE ORAL at 08:48

## 2018-12-11 RX ADMIN — FAMOTIDINE 20 MILLIGRAM(S): 10 INJECTION INTRAVENOUS at 20:44

## 2018-12-11 RX ADMIN — Medication 225 MILLIGRAM(S): at 08:48

## 2018-12-11 RX ADMIN — Medication 1 MILLIGRAM(S): at 08:48

## 2018-12-11 RX ADMIN — Medication 1 MILLIGRAM(S): at 20:44

## 2018-12-12 PROCEDURE — 99232 SBSQ HOSP IP/OBS MODERATE 35: CPT | Mod: 25

## 2018-12-12 PROCEDURE — 90853 GROUP PSYCHOTHERAPY: CPT

## 2018-12-12 RX ORDER — CLONAZEPAM 1 MG
1 TABLET ORAL
Qty: 0 | Refills: 0 | Status: DISCONTINUED | OUTPATIENT
Start: 2018-12-12 | End: 2018-12-17

## 2018-12-12 RX ORDER — LANOLIN ALCOHOL/MO/W.PET/CERES
3 CREAM (GRAM) TOPICAL ONCE
Qty: 0 | Refills: 0 | Status: COMPLETED | OUTPATIENT
Start: 2018-12-12 | End: 2018-12-12

## 2018-12-12 RX ADMIN — Medication 500 MILLIGRAM(S): at 12:45

## 2018-12-12 RX ADMIN — GABAPENTIN 300 MILLIGRAM(S): 400 CAPSULE ORAL at 09:27

## 2018-12-12 RX ADMIN — Medication 500 MILLIGRAM(S): at 13:30

## 2018-12-12 RX ADMIN — Medication 225 MILLIGRAM(S): at 09:27

## 2018-12-12 RX ADMIN — Medication 1 MILLIGRAM(S): at 09:27

## 2018-12-12 RX ADMIN — FAMOTIDINE 20 MILLIGRAM(S): 10 INJECTION INTRAVENOUS at 20:37

## 2018-12-12 RX ADMIN — QUETIAPINE FUMARATE 600 MILLIGRAM(S): 200 TABLET, FILM COATED ORAL at 20:37

## 2018-12-12 RX ADMIN — FAMOTIDINE 20 MILLIGRAM(S): 10 INJECTION INTRAVENOUS at 09:27

## 2018-12-12 RX ADMIN — Medication 1 TABLET(S): at 20:37

## 2018-12-12 RX ADMIN — Medication 1 TABLET(S): at 09:27

## 2018-12-12 RX ADMIN — Medication 3 MILLIGRAM(S): at 23:46

## 2018-12-12 RX ADMIN — Medication 1 MILLIGRAM(S): at 20:37

## 2018-12-13 DIAGNOSIS — Z96.641 PRESENCE OF RIGHT ARTIFICIAL HIP JOINT: ICD-10-CM

## 2018-12-13 DIAGNOSIS — T43.592A POISONING BY OTHER ANTIPSYCHOTICS AND NEUROLEPTICS, INTENTIONAL SELF-HARM, INITIAL ENCOUNTER: ICD-10-CM

## 2018-12-13 DIAGNOSIS — D64.9 ANEMIA, UNSPECIFIED: ICD-10-CM

## 2018-12-13 DIAGNOSIS — F41.9 ANXIETY DISORDER, UNSPECIFIED: ICD-10-CM

## 2018-12-13 DIAGNOSIS — F14.90 COCAINE USE, UNSPECIFIED, UNCOMPLICATED: ICD-10-CM

## 2018-12-13 DIAGNOSIS — F31.9 BIPOLAR DISORDER, UNSPECIFIED: ICD-10-CM

## 2018-12-13 DIAGNOSIS — Z79.82 LONG TERM (CURRENT) USE OF ASPIRIN: ICD-10-CM

## 2018-12-13 DIAGNOSIS — Z81.8 FAMILY HISTORY OF OTHER MENTAL AND BEHAVIORAL DISORDERS: ICD-10-CM

## 2018-12-13 DIAGNOSIS — F19.20 OTHER PSYCHOACTIVE SUBSTANCE DEPENDENCE, UNCOMPLICATED: ICD-10-CM

## 2018-12-13 DIAGNOSIS — F25.1 SCHIZOAFFECTIVE DISORDER, DEPRESSIVE TYPE: ICD-10-CM

## 2018-12-13 PROCEDURE — 90853 GROUP PSYCHOTHERAPY: CPT

## 2018-12-13 PROCEDURE — 99232 SBSQ HOSP IP/OBS MODERATE 35: CPT | Mod: 25

## 2018-12-13 RX ORDER — PHENYLEPHRINE-SHARK LIVER OIL-MINERAL OIL-PETROLATUM RECTAL OINTMENT
1 OINTMENT (GRAM) RECTAL DAILY
Qty: 0 | Refills: 0 | Status: DISCONTINUED | OUTPATIENT
Start: 2018-12-13 | End: 2018-12-17

## 2018-12-13 RX ORDER — QUETIAPINE FUMARATE 200 MG/1
700 TABLET, FILM COATED ORAL AT BEDTIME
Qty: 0 | Refills: 0 | Status: DISCONTINUED | OUTPATIENT
Start: 2018-12-13 | End: 2018-12-17

## 2018-12-13 RX ORDER — LANOLIN ALCOHOL/MO/W.PET/CERES
5 CREAM (GRAM) TOPICAL AT BEDTIME
Qty: 0 | Refills: 0 | Status: DISCONTINUED | OUTPATIENT
Start: 2018-12-13 | End: 2018-12-17

## 2018-12-13 RX ORDER — DOCUSATE SODIUM 100 MG
100 CAPSULE ORAL THREE TIMES A DAY
Qty: 0 | Refills: 0 | Status: DISCONTINUED | OUTPATIENT
Start: 2018-12-13 | End: 2018-12-17

## 2018-12-13 RX ADMIN — FAMOTIDINE 20 MILLIGRAM(S): 10 INJECTION INTRAVENOUS at 21:56

## 2018-12-13 RX ADMIN — QUETIAPINE FUMARATE 700 MILLIGRAM(S): 200 TABLET, FILM COATED ORAL at 21:56

## 2018-12-13 RX ADMIN — GABAPENTIN 300 MILLIGRAM(S): 400 CAPSULE ORAL at 08:10

## 2018-12-13 RX ADMIN — Medication 1 TABLET(S): at 21:56

## 2018-12-13 RX ADMIN — Medication 100 MILLIGRAM(S): at 10:02

## 2018-12-13 RX ADMIN — Medication 1 MILLIGRAM(S): at 21:56

## 2018-12-13 RX ADMIN — Medication 1 TABLET(S): at 08:10

## 2018-12-13 RX ADMIN — Medication 100 MILLIGRAM(S): at 21:56

## 2018-12-13 RX ADMIN — Medication 5 MILLIGRAM(S): at 21:56

## 2018-12-13 RX ADMIN — Medication 1 MILLIGRAM(S): at 08:10

## 2018-12-13 RX ADMIN — Medication 225 MILLIGRAM(S): at 08:10

## 2018-12-13 RX ADMIN — PHENYLEPHRINE-SHARK LIVER OIL-MINERAL OIL-PETROLATUM RECTAL OINTMENT 1 APPLICATION(S): at 21:56

## 2018-12-13 RX ADMIN — Medication 2 MILLIGRAM(S): at 22:36

## 2018-12-14 RX ORDER — PHENYLEPHRINE-SHARK LIVER OIL-MINERAL OIL-PETROLATUM RECTAL OINTMENT
1 OINTMENT (GRAM) RECTAL
Qty: 1 | Refills: 0 | OUTPATIENT
Start: 2018-12-14 | End: 2019-01-12

## 2018-12-14 RX ORDER — FAMOTIDINE 10 MG/ML
1 INJECTION INTRAVENOUS
Qty: 60 | Refills: 0 | OUTPATIENT
Start: 2018-12-14 | End: 2019-01-12

## 2018-12-14 RX ORDER — VENLAFAXINE HCL 75 MG
3 CAPSULE, EXT RELEASE 24 HR ORAL
Qty: 90 | Refills: 0 | OUTPATIENT
Start: 2018-12-14 | End: 2019-01-12

## 2018-12-14 RX ORDER — LANOLIN ALCOHOL/MO/W.PET/CERES
1 CREAM (GRAM) TOPICAL
Qty: 30 | Refills: 0 | OUTPATIENT
Start: 2018-12-14 | End: 2019-01-12

## 2018-12-14 RX ORDER — DOCUSATE SODIUM 100 MG
1 CAPSULE ORAL
Qty: 90 | Refills: 0 | OUTPATIENT
Start: 2018-12-14 | End: 2019-01-12

## 2018-12-14 RX ORDER — CLONAZEPAM 1 MG
1 TABLET ORAL
Qty: 14 | Refills: 0 | OUTPATIENT
Start: 2018-12-14 | End: 2018-12-20

## 2018-12-14 RX ORDER — VENLAFAXINE HCL 75 MG
1 CAPSULE, EXT RELEASE 24 HR ORAL
Qty: 0 | Refills: 0 | COMMUNITY

## 2018-12-14 RX ORDER — QUETIAPINE FUMARATE 200 MG/1
2 TABLET, FILM COATED ORAL
Qty: 60 | Refills: 0 | OUTPATIENT
Start: 2018-12-14 | End: 2019-01-12

## 2018-12-14 RX ORDER — GABAPENTIN 400 MG/1
1 CAPSULE ORAL
Qty: 30 | Refills: 0 | OUTPATIENT
Start: 2018-12-14 | End: 2019-01-12

## 2018-12-14 RX ORDER — QUETIAPINE FUMARATE 200 MG/1
1 TABLET, FILM COATED ORAL
Qty: 30 | Refills: 0 | OUTPATIENT
Start: 2018-12-14 | End: 2019-01-12

## 2018-12-14 RX ADMIN — Medication 1 MILLIGRAM(S): at 21:25

## 2018-12-14 RX ADMIN — PHENYLEPHRINE-SHARK LIVER OIL-MINERAL OIL-PETROLATUM RECTAL OINTMENT 1 APPLICATION(S): at 08:57

## 2018-12-14 RX ADMIN — Medication 1 TABLET(S): at 08:56

## 2018-12-14 RX ADMIN — Medication 100 MILLIGRAM(S): at 08:56

## 2018-12-14 RX ADMIN — Medication 5 MILLIGRAM(S): at 21:25

## 2018-12-14 RX ADMIN — Medication 100 MILLIGRAM(S): at 13:08

## 2018-12-14 RX ADMIN — FAMOTIDINE 20 MILLIGRAM(S): 10 INJECTION INTRAVENOUS at 21:25

## 2018-12-14 RX ADMIN — QUETIAPINE FUMARATE 700 MILLIGRAM(S): 200 TABLET, FILM COATED ORAL at 21:25

## 2018-12-14 RX ADMIN — Medication 1 MILLIGRAM(S): at 08:56

## 2018-12-14 RX ADMIN — Medication 100 MILLIGRAM(S): at 21:25

## 2018-12-14 RX ADMIN — GABAPENTIN 300 MILLIGRAM(S): 400 CAPSULE ORAL at 08:57

## 2018-12-14 RX ADMIN — Medication 1 MILLIGRAM(S): at 22:30

## 2018-12-14 RX ADMIN — Medication 225 MILLIGRAM(S): at 08:57

## 2018-12-15 RX ADMIN — FAMOTIDINE 20 MILLIGRAM(S): 10 INJECTION INTRAVENOUS at 20:48

## 2018-12-15 RX ADMIN — Medication 225 MILLIGRAM(S): at 08:33

## 2018-12-15 RX ADMIN — Medication 100 MILLIGRAM(S): at 08:33

## 2018-12-15 RX ADMIN — FAMOTIDINE 20 MILLIGRAM(S): 10 INJECTION INTRAVENOUS at 08:33

## 2018-12-15 RX ADMIN — Medication 1 MILLIGRAM(S): at 11:30

## 2018-12-15 RX ADMIN — Medication 1 MILLIGRAM(S): at 08:33

## 2018-12-15 RX ADMIN — Medication 5 MILLIGRAM(S): at 20:49

## 2018-12-15 RX ADMIN — Medication 100 MILLIGRAM(S): at 14:45

## 2018-12-15 RX ADMIN — GABAPENTIN 300 MILLIGRAM(S): 400 CAPSULE ORAL at 08:33

## 2018-12-15 RX ADMIN — Medication 100 MILLIGRAM(S): at 20:48

## 2018-12-15 RX ADMIN — Medication 1 MILLIGRAM(S): at 20:48

## 2018-12-15 RX ADMIN — QUETIAPINE FUMARATE 700 MILLIGRAM(S): 200 TABLET, FILM COATED ORAL at 20:49

## 2018-12-15 RX ADMIN — Medication 50 MILLIGRAM(S): at 11:31

## 2018-12-16 RX ADMIN — Medication 225 MILLIGRAM(S): at 08:55

## 2018-12-16 RX ADMIN — Medication 5 MILLIGRAM(S): at 21:36

## 2018-12-16 RX ADMIN — Medication 1 MILLIGRAM(S): at 18:02

## 2018-12-16 RX ADMIN — Medication 100 MILLIGRAM(S): at 21:35

## 2018-12-16 RX ADMIN — Medication 1 MILLIGRAM(S): at 08:53

## 2018-12-16 RX ADMIN — Medication 1 MILLIGRAM(S): at 21:35

## 2018-12-16 RX ADMIN — PHENYLEPHRINE-SHARK LIVER OIL-MINERAL OIL-PETROLATUM RECTAL OINTMENT 1 APPLICATION(S): at 18:03

## 2018-12-16 RX ADMIN — GABAPENTIN 300 MILLIGRAM(S): 400 CAPSULE ORAL at 08:54

## 2018-12-16 RX ADMIN — Medication 100 MILLIGRAM(S): at 08:53

## 2018-12-16 RX ADMIN — QUETIAPINE FUMARATE 700 MILLIGRAM(S): 200 TABLET, FILM COATED ORAL at 21:36

## 2018-12-17 VITALS — TEMPERATURE: 97 F

## 2018-12-17 PROCEDURE — 99232 SBSQ HOSP IP/OBS MODERATE 35: CPT

## 2018-12-17 RX ADMIN — Medication 225 MILLIGRAM(S): at 08:56

## 2018-12-17 RX ADMIN — GABAPENTIN 300 MILLIGRAM(S): 400 CAPSULE ORAL at 08:56

## 2018-12-17 RX ADMIN — Medication 1 MILLIGRAM(S): at 08:56

## 2018-12-17 RX ADMIN — Medication 100 MILLIGRAM(S): at 13:52

## 2018-12-17 RX ADMIN — Medication 100 MILLIGRAM(S): at 08:56

## 2019-08-03 NOTE — H&P PST ADULT - SUPRACLAVICULAR L
Brief Urology Note    This is a 83yo F known to the urology service. She has a history of idiopathic pelvic floor dysfunction which is led to urinary fecal incontinence.  She has had multiple abdominal surgeries, including bowel resections and colostomy revisions.  She currently has an ileostomy.  She manages her bladder with chronic suprapubic tube.  She is been seen in our clinic multiple times for management of urethral leakage.  Options have been extensively discussed with the patient, including anticholinergic medications, Botox injections, and bladder augmentation.  However, due to her insurance and financial issues, as well as poor surgical candidacy, patient has remained on 10 mg of oxybutynin to 3 times daily as maximum treatment available.  Of note, patient is on chronic warfarin therapy for history of DVT.    She comes in today with continued leakage per urethra as well as cramping abdominal pain.  She notes worsening diarrhea and foul-smelling stool.  She denies fevers, nausea, vomiting.  She attempted to irrigate her suprapubic tube at home, however was unable to do so.  She reports that there was some bleeding from her SPT site after she attempted irrigation.    Patient has 18 [place] catheter at suprapubic site. It irrigated very easily with no bleeding. Of note, irrigation fluid immediately drains per urethra. Urine is light yellow without blood.     She is scheduled for SPT exchange later this month. She will keep this appointment. No acute interventions and no acute issues at present.     Discussed with chief resident and staff Dr Jimenez.  Keshia Milner MD Urology PGY3   normal

## 2020-03-12 NOTE — ED ADULT TRIAGE NOTE - PAIN RATING/NUMBER SCALE (0-10): REST
Assisted patient to bedside commode, pt sat for approx. 3 min and states \"I can't pee. \" Pt offered a breakfast tray, call nutrition services for meal tray, spoke to Barrington Bryson. 10

## 2020-09-03 NOTE — ED PROVIDER NOTE - PSH
H/O arthroscopy  Right hip, labral repair 8/14/17  H/O colonoscopy    H/O dilation and curettage    History of appendectomy
7

## 2020-11-19 NOTE — ED PROVIDER NOTE - CPE EDP CARDIAC NORM
normal... You can access the FollowMyHealth Patient Portal offered by [facility] by registering at the following website: [email or web address]. By joining [employer]’s FollowMyHealth portal, you will also be able to view your health information using other applications (apps) compatible with our system.

## 2021-08-11 NOTE — ED PROVIDER NOTE - PSYCHIATRIC, MLM
Name band; Alert and oriented to person, place, time/situation. normal mood and affect. no apparent risk to self or others.

## 2021-09-28 NOTE — BRIEF OPERATIVE NOTE - CONDITION POST OP
Airway  Urgency: elective    Date/Time: 9/28/2021 9:10 AM  Airway not difficult    General Information and Staff    Patient location during procedure: OR    Indications and Patient Condition  Indications for airway management: airway protection    Preoxygenated: yes  MILS maintained throughout  Mask difficulty assessment: 0 - not attempted    Final Airway Details  Final airway type: supraglottic airway      Successful airway: I-gel  Size 4    Number of attempts at approach: 1  Assessment: lips, teeth, and gum same as pre-op               stable

## 2021-10-03 NOTE — OCCUPATIONAL THERAPY INITIAL EVALUATION ADULT - PHYSICAL ASSIST/NONPHYSICAL ASSIST:DRESS LOWER BODY, OT EVAL
Pt is a 46 y/o F PMHx partial hysterectomy p/w left eyelid pain/swelling x 6 days.  Pt reports developing gradual onset pain, swelling, redness at left upper eyelid.  She reports onset after plucking an eyebrow hair.  Pt was see in ED two days ago and was prescribed augmentin and clindamycin with which pt has been compliant. Pt reports this morning she developed slightly worse periorbital swelling, which has improved gradually over time today.  Pt reports overall pain improved.  Pt denies any fevers, chills, vision loss, pain with eye movement, double vision, eye discharge, trauma to eye, use of contact lenses, pain of the eye itself, or any other specific complaints.
1 person assist

## 2021-11-08 NOTE — H&P PST ADULT - RS GEN PE MLT RESP DETAILS PC
----- Message from [address] sent at 11/8/2021 10:30 AM EST -----  Subject: Refill Request    QUESTIONS  Name of Medication? atorvastatin (LIPITOR) 40 mg tablet  Patient-reported dosage and instructions? 40 mg once daily  How many days do you have left? 10  Preferred Pharmacy? [address]  Pharmacy phone number (if available)? [phone]  ---------------------------------------------------------------------------  --------------  Vonda GRACE  What is the best way for the office to contact you? OK to leave message on   voicemail  Preferred Call Back Phone Number?  [phone] good air movement/no rales/no wheezes/clear to auscultation bilaterally/respirations non-labored/breath sounds equal/no rhonchi

## 2021-12-21 NOTE — H&P PST ADULT - NS PRO OT REFERRAL QUES 2 YN
Estella Dawn is a 41 year old female presenting for urgency, incontinence, and constipation.  Transfer of care from Dr. Messina.  Medications: medications verified and updated  Denies Latex allergy or sensitivity  Tobacco reviewed.     no

## 2022-02-04 NOTE — ED ADULT NURSE NOTE - CAS ELECT INFOMATION PROVIDED
Lab unable to draw for 11am as patient is already in the OR lab unable to draw blood for 11am pt c/o bump posterior upper arm DC instructions

## 2022-05-31 NOTE — ED ADULT NURSE NOTE - NS ED NOTE  TALK SOMEONE YN
Length Of Therapy: 1 month Detail Level: Zone Add High Risk Medication Management Associated Diagnosis?: Yes No

## 2022-10-25 NOTE — ED ADULT TRIAGE NOTE - TEMPERATURE IN FAHRENHEIT (DEGREES F)
[Normal S1, S2] : normal S1, S2 [Murmur] : murmur [Normal] : moves all extremities, no focal deficits, normal speech [de-identified] : Right carotid bruit auscultated. [de-identified] : faint systolic murmur at base of heart. 98.6

## 2022-12-01 NOTE — OCCUPATIONAL THERAPY INITIAL EVALUATION ADULT - NS ASR BATHING EQUIP NEEDS
Refilled per protocol.  
Pt. to be educated on benefits and how to privately purchase during upcoming ADL session./shower chair/grab bar

## 2023-02-12 NOTE — ED BEHAVIORAL HEALTH ASSESSMENT NOTE - NS ED BHA DEMOGRAPHICS MEDICAL RECORD REVIEWED YES RECORDS
Yes - the patient is able to be screened
Hospital chart
Devin SCHULTZ: 41F no pmh presents with a cc L eye (noted triage note, L eye is blurry, not R) blurry vision for weeks, thinks may be getting worse over last few days a/w mild eye discomfort, prompting ED visit, reports was seen outpt by optho x2 days ago was encouraged to arrange close follow up w retinal specialist - has not been able to do so yet, reports vision change as "hazy" and "cloudy" no falling curtain, no weakness, Denies numbness, tingling or loss of sensation. exam vss non toxic neuro non focal exam L eye w vision def pressures normal however ddx unclear etiology of pts vision def, less c/f cva as pt can see no other focal signs of weakness, do not think gca as no temp ttp, less likely acute glaucoma as sx for weeks pressures normal, eye externally appears normal, consider possible ret detachment?, pt was offered eval by optho, imaging, (see PN above) however declines and would rather follow up outpt, will dc w rapid optho fu. strict return precautions discussed - pt endorsed understanding.

## 2023-02-16 NOTE — ED ADULT NURSE NOTE - CHIEF COMPLAINT
Quality 130: Documentation Of Current Medications In The Medical Record: Current Medications Documented Quality 431: Preventive Care And Screening: Unhealthy Alcohol Use - Screening: Patient not identified as an unhealthy alcohol user when screened for unhealthy alcohol use using a systematic screening method Quality 226: Preventive Care And Screening: Tobacco Use: Screening And Cessation Intervention: Patient screened for tobacco use and is an ex/non-smoker Detail Level: Detailed The patient is a 53y Female complaining of overdose.

## 2024-03-07 NOTE — ED ADULT TRIAGE NOTE - CADM TRG TX PRIOR TO ARRIVAL
Clinic Care Coordination Contact    Writer had texted Patient and Left voice message introducing Care Coordination on 3-6-24 but no response.      Writer initiated another text today and patient finally responded this afternoon asking if I could help her get on EBT services.  We have been texting back and forth.  Encouraged patient to go to call [facility]--gave phone number---and told her to follow up with their directions on applying for [employer] as well as any other supportive services. Also encouraged her to get on WIC by calling [facility] directly as well.      After texting back and forth questions and answers this afternoon, writer asked if she would be ok with being enrolled in Care Coordination to assist her with pre- follow-up with her pregnancy, etc.     Patient has not  answered writer back as of yet re: enrollment.      Plan: Will continue to await answer re: Care Coordination enrollment. Writer will do a follow up call/text in 2 weeks again to check on patient.     DIGNA ROUSSEAU on 3/7/2024 at 4:59 PM    Clinic Care Coordination Contact    Writer has not received a response back from patient as of yet.      Plan:  Set up a reminder to initiate another contact with patient  in 2 weeks to follow up on encouragement to initiate with [facility] services.     DIGNA ROUSSEAU on 3/8/2024 at 2:37 PM                         IV

## 2024-05-08 NOTE — ED BEHAVIORAL HEALTH ASSESSMENT NOTE - SUMMARY
Remains RICKY on most recent imaging. Does have some ongoing anemia of unclear etiology but no signficant iron deficieny 02/2024 and had normal colnoscopy last year. Will monitor conservatively. More limited by recent hip fracture and profound volume overload. Will start lasix 20mg daily and refer to cardiology.  - Repeat CBC, CMP, CEA and CT torso 6 months for CRC surveillance  - No plan for further endoscopic surveillance given age  - Repeat iron studies in 4 weeks.   53F with schizoaffective disorder, multiple prior suicide attempts and hospitalizations now in outpatient treatment at [facility], with polysubstance use (cocaine, also prescribed high doses of xanax and dilauded), now BIBEMS called by girlfriend after she stated she overdosed on 27 xanax pills and 1000 seroquel ER.    Patient is admitted to medicine and psychiatry consulted for management of agitation. On exam patient had just received versed 2mg IM prior to the interview and was noticeably calmer by the end of the interview. However she is an unreliable historian and as such she will need to be medically admitted and monitored for further overdose effects. Of concern is her recent prescriptions of high dose dilauded and xanax which would put her at risk for respiratory depression, though per discussion with ER attending Dr. Aguila patient has exhibited no signs of benzo intoxication and has not had respiratory depression thus far, which Dr. Aguila feels is incompatable with amount of xanax patient reportedly took. Still, given risk of benzo and opiate concurrent use, would recommend using haldol for agitation.

## 2024-09-10 NOTE — PATIENT PROFILE ADULT. - NS PRO TALK SOMEONE YN
PRE-SEDATION ASSESSMENT    CONSENT  Risks, benefits, and alternatives have been discussed with the patient/patient representative, and patient/patient representative agrees to proceed: Yes    MEDICAL HISTORY       PHYSICAL EXAM  History and Physical Reviewed: Patient has valid H&P within 30 days. I have reviewed and there are no changes.  Airway Risk History: No previous complications  Airway Anatomy : Class II  Heart : Normal  Lungs : Normal  LOC/Mental Status : Normal    OTHER FINDINGS  Pertinent lab/diagnostic test reviewed: Yes    SEDATION RISK ASSESSMENT  Risk Status ASA: Class II - Normal patient with mild systemic disease  Plan for Sedation: Moderate Sedation  EKG Monitoring: Yes    NARRATIVE FINDINGS      no

## 2025-01-27 NOTE — H&P PST ADULT - NSCAFFEINETYPE_GEN_ALL_CORE_SD
Health Call Center    Phone Message    May a detailed message be left on voicemail: yes     Reason for Call: Medication Question or concern regarding medication   Prescription Clarification  Name of Medication: mirabegron (MYRBETRIQ) 50 MG 24 hr tablet   Prescribing Provider: Rajwinder Clifton MD   Pharmacy:   [facility]/pharmacy #[id number] - [place], [place] - [address] (Ph: [phone])   What on the order needs clarification? Home health calling with question about medication. Last AVS listed 25mg and 50mg, is patient supposed to be taking both. Patient also got a letter from insurance that generic is not covered but brand name is, wants to make sure that is ok to keeo getting brand name prescribed.      Action Taken: Message routed to:  Other: Urology    Travel Screening: Not Applicable                                                                         coffee

## 2025-03-24 NOTE — ED BEHAVIORAL HEALTH ASSESSMENT NOTE - NS ED BHA MSE SPEECH SPONTANEITY
Chaperone for Intimate Exam     Chaperone was offer accepted as part of the rooming process    Chaperone: Yadi Crespo    
I have reviewed the patient's visit today including history, exam and assessment by NANCY Ayon.  I agree with treatment/plan as above.    Hayes Fagan MD  9:41 AM  03/24/25   
Patient here for AE.    Patient would like to discuss when her next pap smear will be, denies hx of abnormal pap smears.     LAST PAP:  3/21/2024, NEG., HPV neg.     LAST MAMMO:  7/6/2023    LMP:  No LMP recorded. Patient is postmenopausal.    BIRTH CONTROL:  post menopausal status    TOBACCO USE:  yes-cutting back one cig a week, down to 6 daily.     FAMILY HISTORY OF:   Breast Cancer:  Yes   Ovarian Cancer:  No   Uterine Cancer:  No   Colon Cancer:  No    Vitals:    03/24/25 0814   BP: 124/66   BP Site: Left Upper Arm   Patient Position: Sitting   Weight: 82.6 kg (182 lb)   Height: 1.676 m (5' 6\")        ALEX MESA RN  03/24/25  8:21 AM    
uniform and thin, 3 mm.    D/W pt need for sampling due to cont bleeding/age (Dr. Schwab, Gyn Onc agrees)  PLAN:  Hysteroscopy D&C with Myosure removal of fibroids  D/W pt at length risks/benefits of procedure including but not limited to death, bleeding, infection, perforation and damage to other internal organs. She exhibited full understanding and wishes to proceed.      Last Assessment & Plan:   Formatting of this note might be different from the original.  Pelvic US on 11/27/18:  The uterus is normal size and measures 10.7 cm x 6. cm x 7.5 cm.   There are multiple, at least 3 hypoechoic myometrial and subserosal fibroids  which measure 2.2-3.1 cm. The endometrial echo stripe is uniform and thin, 3 mm.    D/W pt need for sampling due to cont bleeding/age (Dr. Schwab, Gyn Onc agrees)  PLAN:  Hysteroscopy D&C with Myosure removal of fibroids  D/W pt at length risks/benefits of procedure including but not limited to death, bleeding, infection, perforation and damage to other internal organs. She exhibited full understanding and wishes to proceed.      Fibroids, submucosal 12/11/2018     Overview Note:     noted      Formatting of this note might be different from the original.  noted    Last Assessment & Plan:   Formatting of this note might be different from the original.  See A&P under PMB      Overweight (BMI 25.0-29.9) 02/15/2017    Gastroesophageal reflux disease 02/15/2017    Essential hypertension 02/15/2017    Hemorrhoids 02/15/2017       Problem List Items Addressed This Visit          Other    Women's annual routine gynecological examination - Primary     Pap up to date  Mammo ordered already, number rpovided to call and schedule  Colonoscopy due this year, poor prep last year, pt to call and schedule  Taking calcium and vit D  Rto 1 year            No orders of the defined types were placed in this encounter.      Outpatient Encounter Medications as of 3/24/2025   Medication Sig Dispense Refill    
Normal